# Patient Record
Sex: FEMALE | Race: WHITE | NOT HISPANIC OR LATINO | Employment: FULL TIME | URBAN - METROPOLITAN AREA
[De-identification: names, ages, dates, MRNs, and addresses within clinical notes are randomized per-mention and may not be internally consistent; named-entity substitution may affect disease eponyms.]

---

## 2017-02-07 ENCOUNTER — OFFICE VISIT (OUTPATIENT)
Dept: FAMILY MEDICINE CLINIC | Facility: CLINIC | Age: 52
End: 2017-02-07

## 2017-02-07 VITALS
OXYGEN SATURATION: 98 % | SYSTOLIC BLOOD PRESSURE: 108 MMHG | BODY MASS INDEX: 28.56 KG/M2 | HEIGHT: 67 IN | HEART RATE: 89 BPM | WEIGHT: 182 LBS | DIASTOLIC BLOOD PRESSURE: 76 MMHG | TEMPERATURE: 97.3 F

## 2017-02-07 DIAGNOSIS — J02.9 ACUTE PHARYNGITIS, UNSPECIFIED ETIOLOGY: Primary | ICD-10-CM

## 2017-02-07 DIAGNOSIS — J02.9 SORE THROAT: ICD-10-CM

## 2017-02-07 LAB
EXPIRATION DATE: NORMAL
INTERNAL CONTROL: NORMAL
Lab: NORMAL
S PYO AG THROAT QL: NEGATIVE

## 2017-02-07 PROCEDURE — 87880 STREP A ASSAY W/OPTIC: CPT | Performed by: NURSE PRACTITIONER

## 2017-02-07 PROCEDURE — 99213 OFFICE O/P EST LOW 20 MIN: CPT | Performed by: NURSE PRACTITIONER

## 2017-02-07 NOTE — PROGRESS NOTES
"Subjective   Graciela Galvan is a 51 y.o. female.   Coughing since last Saturday, no fever.    Chief Complaint   Patient presents with   • Sore Throat     x 3 days   • Nasal Congestion     Social History   Substance Use Topics   • Smoking status: Former Smoker   • Smokeless tobacco: None   • Alcohol use None       History of Present Illness     The following portions of the patient's history were reviewed and updated as appropriate: allergies, current medications, past social history and problem list.  Review of Systems   HENT: Positive for congestion and sore throat.    All other systems reviewed and are negative.      Objective   Vitals:    02/07/17 0952   BP: 108/76   Pulse: 89   Temp: 97.3 °F (36.3 °C)   SpO2: 98%   Weight: 182 lb (82.6 kg)   Height: 67\" (170.2 cm)     Body mass index is 28.51 kg/(m^2).    Physical Exam   Constitutional: She appears well-developed and well-nourished. No distress.   HENT:   Head: Normocephalic and atraumatic.   Right Ear: External ear normal.   Left Ear: External ear normal.   Op red with drainage   Eyes: EOM are normal.   Neck: Neck supple. No thyromegaly present.   Cardiovascular: Normal rate, regular rhythm and normal heart sounds.    Pulmonary/Chest: Effort normal and breath sounds normal.   Musculoskeletal: Normal range of motion.   Neurological: She is alert.   Skin: Skin is warm.   Nursing note and vitals reviewed.      Assessment/Plan   Problem List Items Addressed This Visit     None      Visit Diagnoses     Acute pharyngitis, unspecified etiology    -  Primary    Sore throat        Relevant Orders    POC Rapid Strep A (Completed)         RSS in office negative.  Warm salt water gargles, call for worsening symptoms.  "

## 2017-02-09 ENCOUNTER — TELEPHONE (OUTPATIENT)
Dept: FAMILY MEDICINE CLINIC | Facility: CLINIC | Age: 52
End: 2017-02-09

## 2017-02-09 NOTE — TELEPHONE ENCOUNTER
Pt called and stated she is feeling worse and has c/o nasal congestion and feels that it is going down into chest. Pt was here Tuesday and wanted to make sure that she didn't have strep and she didn't.

## 2017-02-10 ENCOUNTER — OFFICE VISIT (OUTPATIENT)
Dept: FAMILY MEDICINE CLINIC | Facility: CLINIC | Age: 52
End: 2017-02-10

## 2017-02-10 VITALS
TEMPERATURE: 97.6 F | OXYGEN SATURATION: 99 % | WEIGHT: 179 LBS | HEIGHT: 67 IN | SYSTOLIC BLOOD PRESSURE: 102 MMHG | BODY MASS INDEX: 28.09 KG/M2 | DIASTOLIC BLOOD PRESSURE: 82 MMHG | HEART RATE: 85 BPM

## 2017-02-10 DIAGNOSIS — J01.10 ACUTE NON-RECURRENT FRONTAL SINUSITIS: Primary | ICD-10-CM

## 2017-02-10 PROCEDURE — 99214 OFFICE O/P EST MOD 30 MIN: CPT | Performed by: NURSE PRACTITIONER

## 2017-02-10 RX ORDER — AMOXICILLIN 500 MG/1
1000 CAPSULE ORAL 2 TIMES DAILY
Qty: 40 CAPSULE | Refills: 0 | Status: SHIPPED | OUTPATIENT
Start: 2017-02-10 | End: 2017-06-28

## 2017-02-10 NOTE — PATIENT INSTRUCTIONS
You have an acute maxillary sinusitis. The first line treatment for this is an antibiotic, Amoxil. You have been prescribed Amoxil 500 mg , 2 tabs, three times a day, for 10 days. Make sure you take all of this antibiotic.    To help with your symptoms:    In general :   Use over the counter ( OTC) medications only until symptoms resolve.  Buy generics - they work just as well and for less money.  Try to find single ingredient products.  Increase fluids to 8 - 10 glasses of non-caffeine beverages a day.    For congestion and headache - Sudafed, phenylephrine, Afrin nasal spray and  ibuprofen, acetaminophen, naproxen or aspirin.  Hot, steamy showers work, too.    For cough - Guaifenisen ( Mucinex, Robitussin or Humabid) or Delsym.    For sore throat - cough drops, honey  and warm salt water gargles.    If you are not better in a week or if you get worse, call us.

## 2017-02-10 NOTE — PROGRESS NOTES
Subjective   Graciela Galvan is a 51 y.o. female.     History of Present Illness Patient states for six days she has had a dry cough that is worse at night. She also has nasal congestion. Patient does states that she needs [place] paperwork [place] due to lack of sick time at work. She has missed work all week. Pt had a negative strep test [facility] that was negative.   Pt had a flu shot.   Sinus Pain: Patient complains of congestion, facial pain, low grade fever, nasal congestion and sinus pressure. Symptoms include congestion, nasal congestion and sinus pressure with no fever, chills, night sweats or weight loss. Onset of symptoms was 1 week ago, gradually worsening since that time. She is drinking plenty of fluids.  Past history is significant for no history of pneumonia or bronchitis. Patient is non-smoker    The following portions of the patient's history were reviewed and updated as appropriate: allergies, current medications, past family history, past medical history, past social history, past surgical history and problem list.    Review of Systems   Constitutional: Negative for fever.   HENT: Negative for sore throat.    Respiratory: Positive for cough.        Objective   Physical Exam   Constitutional: She is oriented to person, place, and time. She appears well-developed and well-nourished. No distress.   HENT:   Head: Normocephalic and atraumatic.   Right Ear: External ear normal.   Left Ear: External ear normal.   Nose: Mucosal edema and rhinorrhea present. Right sinus exhibits maxillary sinus tenderness. Left sinus exhibits maxillary sinus tenderness.   Mouth/Throat: Oropharynx is clear and moist. No oropharyngeal exudate.   Eyes: Conjunctivae and EOM are normal. Pupils are equal, round, and reactive to light. Right eye exhibits no discharge.   Neck: Normal range of motion. Neck supple.   Cardiovascular: Normal rate and regular rhythm.    Pulmonary/Chest: Effort normal and breath sounds  normal. No respiratory distress. She has no wheezes.   Lymphadenopathy:     She has no cervical adenopathy.   Neurological: She is alert and oriented to person, place, and time.   Skin: Skin is warm and dry. No rash noted.   Psychiatric: She has a normal mood and affect. Her behavior is normal.   Nursing note and vitals reviewed.      Assessment/Plan   Graciela was seen today for cough.    Diagnoses and all orders for this visit:    Acute non-recurrent frontal sinusitis  -     amoxicillin (AMOXIL) 500 MG capsule; Take 2 capsules by mouth 2 (Two) Times a Day.

## 2017-04-05 DIAGNOSIS — I10 BENIGN ESSENTIAL HYPERTENSION: ICD-10-CM

## 2017-04-05 DIAGNOSIS — F32.A DEPRESSION: ICD-10-CM

## 2017-04-06 RX ORDER — METOPROLOL SUCCINATE 50 MG/1
TABLET, EXTENDED RELEASE ORAL
Qty: 90 TABLET | Refills: 0 | Status: SHIPPED | OUTPATIENT
Start: 2017-04-06 | End: 2017-08-13 | Stop reason: SDUPTHER

## 2017-04-06 RX ORDER — DULOXETIN HYDROCHLORIDE 60 MG/1
CAPSULE, DELAYED RELEASE ORAL
Qty: 180 CAPSULE | Refills: 0 | Status: SHIPPED | OUTPATIENT
Start: 2017-04-06 | End: 2017-07-20 | Stop reason: SDUPTHER

## 2017-04-09 DIAGNOSIS — F32.A DEPRESSION: ICD-10-CM

## 2017-04-10 RX ORDER — BUPROPION HYDROCHLORIDE 300 MG/1
TABLET ORAL
Qty: 90 TABLET | Refills: 0 | Status: SHIPPED | OUTPATIENT
Start: 2017-04-10 | End: 2017-07-20 | Stop reason: SDUPTHER

## 2017-06-28 ENCOUNTER — OFFICE VISIT (OUTPATIENT)
Dept: FAMILY MEDICINE CLINIC | Facility: CLINIC | Age: 52
End: 2017-06-28

## 2017-06-28 VITALS
HEART RATE: 77 BPM | WEIGHT: 179.1 LBS | DIASTOLIC BLOOD PRESSURE: 70 MMHG | BODY MASS INDEX: 28.05 KG/M2 | OXYGEN SATURATION: 99 % | SYSTOLIC BLOOD PRESSURE: 112 MMHG

## 2017-06-28 DIAGNOSIS — R10.12 LEFT UPPER QUADRANT PAIN: Primary | ICD-10-CM

## 2017-06-28 DIAGNOSIS — F10.10 ALCOHOL ABUSE: ICD-10-CM

## 2017-06-28 LAB
ALBUMIN SERPL-MCNC: 4.4 G/DL (ref 3.5–5.2)
ALBUMIN/GLOB SERPL: 1.1 G/DL
ALP SERPL-CCNC: 56 U/L (ref 39–117)
ALT SERPL-CCNC: 43 U/L (ref 1–33)
AST SERPL-CCNC: 35 U/L (ref 1–32)
BILIRUB SERPL-MCNC: 0.5 MG/DL (ref 0.1–1.2)
BUN SERPL-MCNC: 8 MG/DL (ref 6–20)
BUN/CREAT SERPL: 7.8 (ref 7–25)
CALCIUM SERPL-MCNC: 10.9 MG/DL (ref 8.6–10.5)
CHLORIDE SERPL-SCNC: 98 MMOL/L (ref 98–107)
CO2 SERPL-SCNC: 26.2 MMOL/L (ref 22–29)
CREAT SERPL-MCNC: 1.03 MG/DL (ref 0.57–1)
GLOBULIN SER CALC-MCNC: 4 GM/DL
GLUCOSE SERPL-MCNC: 100 MG/DL (ref 65–99)
POTASSIUM SERPL-SCNC: 4.3 MMOL/L (ref 3.5–5.2)
PROT SERPL-MCNC: 8.4 G/DL (ref 6–8.5)
SODIUM SERPL-SCNC: 138 MMOL/L (ref 136–145)

## 2017-06-28 PROCEDURE — 99213 OFFICE O/P EST LOW 20 MIN: CPT | Performed by: NURSE PRACTITIONER

## 2017-06-28 NOTE — PROGRESS NOTES
Graciela Galvan is a 51 y.o. female.  Seen 06/28/2017    Assessment/Plan   Problem List Items Addressed This Visit     None             No Follow-up on file.  There are no Patient Instructions on file for this visit.    Vitals:    06/28/17 0902   BP: 112/70   Pulse: 77   SpO2: 99%   Weight: 179 lb 1.6 oz (81.2 kg)     Body mass index is 28.05 kg/(m^2).    Subjective     Chief Complaint   Patient presents with   • Abdominal Pain     Pain in LT torso x 4 days is worse upon movement     Social History   Substance Use Topics   • Smoking status: Former Smoker   • Smokeless tobacco: Never Used   • Alcohol use No      Comment: Quit drinking x 2 weeks       History of Present Illness   Pain to left side after taking a drink of water 4 days. Pain with inspiration.  Took some ibuprofen which didn't help. The pain is intermittent, deep and radiating.  No known injury.  H/O alcohol abuse, recently quit    The following portions of the patient's history were reviewed and updated as appropriate:PMHroutine: Social history , Allergies, Current Medications and Active Problem List    Review of Systems   Gastrointestinal: Positive for abdominal pain.   All other systems reviewed and are negative.      Objective   Physical Exam   Constitutional: She appears well-developed and well-nourished. No distress.   HENT:   Head: Normocephalic and atraumatic.   Right Ear: External ear normal.   Left Ear: External ear normal.   Eyes: EOM are normal.   Neck: Neck supple. No thyromegaly present.   Cardiovascular: Normal rate, regular rhythm and normal heart sounds.    Pulmonary/Chest: Effort normal and breath sounds normal.   Abdominal: Soft. Bowel sounds are normal. She exhibits no distension. There is tenderness. There is no rebound and no guarding.   Tenderness to left upper quadrant   Musculoskeletal: Normal range of motion.   Neurological: She is alert.   Skin: Skin is warm.   Psychiatric: She has a normal mood and affect.   Nursing note and  vitals reviewed.    Reviewed Data:  No visits with results within 1 Month(s) from this visit.  Latest known visit with results is:    Office Visit on 02/07/2017   Component Date Value Ref Range Status   • Rapid Strep A Screen 02/07/2017 Negative  Negative, VALID, INVALID, Not Performed Final   • Internal Control 02/07/2017 Passed  Passed Final   • Lot Number 02/07/2017 [id number]   Final   • Expiration Date 02/07/2017 6/2018   Final

## 2017-06-29 DIAGNOSIS — R79.89 ABNORMAL LFTS: Primary | ICD-10-CM

## 2017-07-20 DIAGNOSIS — F32.A DEPRESSION: ICD-10-CM

## 2017-07-21 RX ORDER — BUPROPION HYDROCHLORIDE 300 MG/1
TABLET ORAL
Qty: 90 TABLET | Refills: 0 | Status: SHIPPED | OUTPATIENT
Start: 2017-07-21 | End: 2022-05-09

## 2017-07-21 RX ORDER — DULOXETIN HYDROCHLORIDE 60 MG/1
CAPSULE, DELAYED RELEASE ORAL
Qty: 180 CAPSULE | Refills: 0 | Status: SHIPPED | OUTPATIENT
Start: 2017-07-21 | End: 2018-06-04

## 2017-08-13 DIAGNOSIS — I10 BENIGN ESSENTIAL HYPERTENSION: ICD-10-CM

## 2017-08-14 ENCOUNTER — TELEPHONE (OUTPATIENT)
Dept: FAMILY MEDICINE CLINIC | Facility: CLINIC | Age: 52
End: 2017-08-14

## 2017-08-14 ENCOUNTER — OFFICE VISIT (OUTPATIENT)
Dept: FAMILY MEDICINE CLINIC | Facility: CLINIC | Age: 52
End: 2017-08-14

## 2017-08-14 VITALS
WEIGHT: 180 LBS | DIASTOLIC BLOOD PRESSURE: 82 MMHG | BODY MASS INDEX: 28.25 KG/M2 | HEIGHT: 67 IN | HEART RATE: 79 BPM | OXYGEN SATURATION: 98 % | SYSTOLIC BLOOD PRESSURE: 130 MMHG

## 2017-08-14 DIAGNOSIS — L71.9 ACNE ROSACEA: Primary | ICD-10-CM

## 2017-08-14 PROCEDURE — 99213 OFFICE O/P EST LOW 20 MIN: CPT | Performed by: FAMILY MEDICINE

## 2017-08-14 RX ORDER — METRONIDAZOLE 10 MG/G
GEL TOPICAL DAILY
Qty: 55 G | Refills: 6 | Status: SHIPPED | OUTPATIENT
Start: 2017-08-14 | End: 2017-09-22 | Stop reason: HOSPADM

## 2017-08-14 RX ORDER — TRAZODONE HYDROCHLORIDE 50 MG/1
25-50 TABLET ORAL NIGHTLY
COMMUNITY
End: 2017-09-22 | Stop reason: HOSPADM

## 2017-08-14 RX ORDER — METOPROLOL SUCCINATE 50 MG/1
TABLET, EXTENDED RELEASE ORAL
Qty: 30 TABLET | Refills: 0 | Status: SHIPPED | OUTPATIENT
Start: 2017-08-14 | End: 2017-09-11 | Stop reason: SDUPTHER

## 2017-08-14 NOTE — TELEPHONE ENCOUNTER
Patient left a  requesting a dermatology referral. The patient was contacted and made aware that we would need to see her in the office for the problem, in order to place a referral.

## 2017-08-14 NOTE — PROGRESS NOTES
"Graciela Galvan is a 51 y.o. female.Patient has an unhealing spot on the right side of her face that has been present for 2 months.   Seen 08/14/2017    Assessment/Plan   Problem List Items Addressed This Visit        Musculoskeletal and Integument    Acne rosacea - Primary    Relevant Medications    metroNIDAZOLE (METROGEL) 1 % gel             No Follow-up on file.  There are no Patient Instructions on file for this visit.    Subjective     Chief Complaint   Patient presents with   • Abrasion     Social History   Substance Use Topics   • Smoking status: Former Smoker   • Smokeless tobacco: Never Used   • Alcohol use No      Comment: Quit drinking x 2 weeks       History of Present Illness     Patient noticed the lesion on her face about a month ago.  She has done surgery on it and it won't heal properly.  She is a little concerned about it and came here to have it checked today.    The following portions of the patient's history were reviewed and updated as appropriate:PMHroutine: Social history , Allergies, Current Medications, Active Problem List and Health Maintenance    Review of Systems   Skin: Positive for rash.   All other systems reviewed and are negative.      Objective   Vitals:    08/14/17 1614   BP: 130/82   Pulse: 79   SpO2: 98%   Weight: 180 lb (81.6 kg)   Height: 67\" (170.2 cm)     Body mass index is 28.19 kg/(m^2).  Physical Exam   Constitutional: She appears well-developed and well-nourished.   Psychiatric: She has a normal mood and affect. Her behavior is normal. Judgment and thought content normal.   Vitals reviewed.                "

## 2017-08-14 NOTE — PATIENT INSTRUCTIONS
Clarisonic, cetaphil lotion, trial of metrogel for one month. If lesion persists -- to the dermatologist.

## 2017-08-22 ENCOUNTER — RESULTS ENCOUNTER (OUTPATIENT)
Dept: FAMILY MEDICINE CLINIC | Facility: CLINIC | Age: 52
End: 2017-08-22

## 2017-08-22 DIAGNOSIS — R79.89 ABNORMAL LFTS: ICD-10-CM

## 2017-09-11 DIAGNOSIS — I10 BENIGN ESSENTIAL HYPERTENSION: ICD-10-CM

## 2017-09-12 RX ORDER — METOPROLOL SUCCINATE 50 MG/1
TABLET, EXTENDED RELEASE ORAL
Qty: 15 TABLET | Refills: 0 | Status: SHIPPED | OUTPATIENT
Start: 2017-09-12 | End: 2017-09-30 | Stop reason: SDUPTHER

## 2017-09-18 ENCOUNTER — TELEPHONE (OUTPATIENT)
Dept: FAMILY MEDICINE CLINIC | Facility: CLINIC | Age: 52
End: 2017-09-18

## 2017-09-18 DIAGNOSIS — L98.9 FACIAL LESION: Primary | ICD-10-CM

## 2017-09-18 NOTE — TELEPHONE ENCOUNTER
----- Message from Shannan Gaspar MA sent at 9/13/2017  4:35 PM EDT -----  Pt left VM stating that the metrogel is not working. You told her to call and let you know so that you could switch her to something else. Please advise.

## 2017-09-22 ENCOUNTER — OFFICE VISIT (OUTPATIENT)
Dept: FAMILY MEDICINE CLINIC | Facility: CLINIC | Age: 52
End: 2017-09-22

## 2017-09-22 VITALS
WEIGHT: 179 LBS | BODY MASS INDEX: 28.09 KG/M2 | DIASTOLIC BLOOD PRESSURE: 88 MMHG | SYSTOLIC BLOOD PRESSURE: 136 MMHG | HEIGHT: 67 IN | OXYGEN SATURATION: 99 % | HEART RATE: 77 BPM

## 2017-09-22 DIAGNOSIS — E78.1 PRIMARY HYPERTRIGLYCERIDEMIA: ICD-10-CM

## 2017-09-22 DIAGNOSIS — E03.8 SUBCLINICAL HYPOTHYROIDISM: ICD-10-CM

## 2017-09-22 DIAGNOSIS — I10 BENIGN ESSENTIAL HYPERTENSION: Primary | ICD-10-CM

## 2017-09-22 DIAGNOSIS — R73.9 HYPERGLYCEMIA: ICD-10-CM

## 2017-09-22 DIAGNOSIS — Z23 NEED FOR VACCINATION: ICD-10-CM

## 2017-09-22 DIAGNOSIS — Z00.00 HEALTHCARE MAINTENANCE: ICD-10-CM

## 2017-09-22 LAB
BASOPHILS # BLD AUTO: 0.05 10*3/MM3 (ref 0–0.2)
BASOPHILS NFR BLD AUTO: 1.2 % (ref 0–1.5)
EOSINOPHIL # BLD AUTO: 0.17 10*3/MM3 (ref 0–0.7)
EOSINOPHIL NFR BLD AUTO: 4.2 % (ref 0.3–6.2)
ERYTHROCYTE [DISTWIDTH] IN BLOOD BY AUTOMATED COUNT: 13 % (ref 11.7–13)
HCT VFR BLD AUTO: 44.2 % (ref 35.6–45.5)
HGB BLD-MCNC: 14.8 G/DL (ref 11.9–15.5)
IMM GRANULOCYTES # BLD: 0.04 10*3/MM3 (ref 0–0.03)
IMM GRANULOCYTES NFR BLD: 1 % (ref 0–0.5)
LYMPHOCYTES # BLD AUTO: 1.53 10*3/MM3 (ref 0.9–4.8)
LYMPHOCYTES NFR BLD AUTO: 37.7 % (ref 19.6–45.3)
MCH RBC QN AUTO: 32.2 PG (ref 26.9–32)
MCHC RBC AUTO-ENTMCNC: 33.5 G/DL (ref 32.4–36.3)
MCV RBC AUTO: 96.1 FL (ref 80.5–98.2)
MONOCYTES # BLD AUTO: 0.54 10*3/MM3 (ref 0.2–1.2)
MONOCYTES NFR BLD AUTO: 13.3 % (ref 5–12)
NEUTROPHILS # BLD AUTO: 1.73 10*3/MM3 (ref 1.9–8.1)
NEUTROPHILS NFR BLD AUTO: 42.6 % (ref 42.7–76)
PLATELET # BLD AUTO: 276 10*3/MM3 (ref 140–500)
RBC # BLD AUTO: 4.6 10*6/MM3 (ref 3.9–5.2)
WBC # BLD AUTO: 4.06 10*3/MM3 (ref 4.5–10.7)

## 2017-09-22 PROCEDURE — 90686 IIV4 VACC NO PRSV 0.5 ML IM: CPT | Performed by: FAMILY MEDICINE

## 2017-09-22 PROCEDURE — 90471 IMMUNIZATION ADMIN: CPT | Performed by: FAMILY MEDICINE

## 2017-09-22 PROCEDURE — 99214 OFFICE O/P EST MOD 30 MIN: CPT | Performed by: FAMILY MEDICINE

## 2017-09-22 NOTE — PROGRESS NOTES
Graciela Galvan is a 51 y.o. female.  Seen 09/22/2017    Assessment/Plan   Problem List Items Addressed This Visit        Cardiovascular and Mediastinum    Benign essential hypertension - Primary    Overview     [facility] 9/22/2017  BP is controlled well. She will recheck in six months. She will continue present meds.  She will monitor at home because it is a little elevated today. Needs to lose ten lbs and exercise         Primary hypertriglyceridemia    Overview     [facility] 9/22/2017  Labs are drawn today.           Relevant Orders    Lipid Panel With LDL/HDL Ratio       Endocrine    Subclinical hypothyroidism    Overview     [facility] 9/22/2017  Labs are drawn today.           Relevant Orders    TSH       Other    Hyperglycemia    Overview     [facility] 9/22/2017  Labs are drawn today.           Relevant Orders    Hemoglobin A1c      Other Visit Diagnoses     Need for vaccination        Relevant Orders    Flu Vaccine Quad PF 3YR+ (FLUARIX/FLUZONE [id number]) (Completed)    Healthcare maintenance        Relevant Orders    Comprehensive metabolic panel    Lipid Panel With LDL/HDL Ratio    CBC and Differential    Hepatitis C Antibody    Hemoglobin A1c    TSH             Return in about 6 months (around 3/22/2018).  Patient Instructions   MyPlate from [employer]  The general, healthful diet is based on the 2010 Dietary Guidelines for Americans. The amount of food you need to eat from each food group depends on your age, sex, and level of physical activity and can be individualized by a dietitian. Go to ChooseMyPlate.gov for more information.      Subjective     Chief Complaint   Patient presents with   • Hypertension     Social History   Substance Use Topics   • Smoking status: Former Smoker   • Smokeless tobacco: Never Used   • Alcohol use No      Comment: Quit drinking x 2 weeks       History of Present Illness     Patient is here for follow-up of hypertension. She is not exercising and is adherent to a low-salt diet.  "Patient does BP checks at home: but hasn't been checking it recently. Patient denies chest pain and dyspnea. Cardiovascular risk factors: hypertension and sedentary lifestyle. Use of agents associated with hypertension: none. History of target organ damage: none. She is compliant with meds.     The following portions of the patient's history were reviewed and updated as appropriate:PMHroutine: Social history , Allergies, Current Medications, Active Problem List and Health Maintenance    Review of Systems   Constitutional: Negative for activity change, appetite change, chills, fatigue, fever and unexpected weight change.   HENT: Negative for congestion, ear pain, hearing loss, nosebleeds, rhinorrhea and sore throat.    Eyes: Negative for pain, redness and visual disturbance.   Respiratory: Negative for cough, shortness of breath and wheezing.    Cardiovascular: Negative for chest pain, palpitations and leg swelling.   Gastrointestinal: Negative for abdominal pain, blood in stool, constipation, diarrhea, nausea and vomiting.   Endocrine: Negative for cold intolerance and heat intolerance.   Genitourinary: Negative for difficulty urinating, dysuria, frequency, hematuria, pelvic pain, urgency and vaginal discharge.   Musculoskeletal: Negative for arthralgias, back pain and joint swelling.   Skin: Negative for rash and wound.   Neurological: Negative for dizziness, weakness, numbness and headaches.   Hematological: Does not bruise/bleed easily.   Psychiatric/Behavioral: Negative for dysphoric mood, sleep disturbance and suicidal ideas. The patient is not nervous/anxious.    All other systems reviewed and are negative.      Objective   Vitals:    09/22/17 0825   BP: 136/88   Pulse: 77   SpO2: 99%   Weight: 179 lb (81.2 kg)   Height: 67\" (170.2 cm)     Body mass index is 28.04 kg/(m^2).  Physical Exam   Constitutional: She is oriented to person, place, and time. Vital signs are normal. She appears well-developed and " well-nourished. No distress.   HENT:   Head: Normocephalic.   Cardiovascular: Normal rate, regular rhythm and normal heart sounds.    Pulmonary/Chest: Effort normal and breath sounds normal.   Neurological: She is alert and oriented to person, place, and time. Gait normal.   Psychiatric: She has a normal mood and affect. Her behavior is normal. Judgment and thought content normal.   Vitals reviewed.    Reviewed Data:  No visits with results within 1 Month(s) from this visit.  Latest known visit with results is:    Office Visit on 06/28/2017   Component Date Value Ref Range Status   • Glucose 06/28/2017 100* 65 - 99 mg/dL Final   • BUN 06/28/2017 8  6 - 20 mg/dL Final   • Creatinine 06/28/2017 1.03* 0.57 - 1.00 mg/dL Final   • eGFR Non African Am 06/28/2017 56* >60 mL/min/1.73 Final   • eGFR African Am 06/28/2017 68  >60 mL/min/1.73 Final   • BUN/Creatinine Ratio 06/28/2017 7.8  7.0 - 25.0 Final   • Sodium 06/28/2017 138  136 - 145 mmol/L Final   • Potassium 06/28/2017 4.3  3.5 - 5.2 mmol/L Final   • Chloride 06/28/2017 98  98 - 107 mmol/L Final   • Total CO2 06/28/2017 26.2  22.0 - 29.0 mmol/L Final   • Calcium 06/28/2017 10.9* 8.6 - 10.5 mg/dL Final   • Total Protein 06/28/2017 8.4  6.0 - 8.5 g/dL Final   • Albumin 06/28/2017 4.40  3.50 - 5.20 g/dL Final   • Globulin 06/28/2017 4.0  gm/dL Final   • A/G Ratio 06/28/2017 1.1  g/dL Final   • Total Bilirubin 06/28/2017 0.5  0.1 - 1.2 mg/dL Final   • Alkaline Phosphatase 06/28/2017 56  39 - 117 U/L Final   • AST (SGOT) 06/28/2017 35* 1 - 32 U/L Final   • ALT (SGPT) 06/28/2017 43* 1 - 33 U/L Final

## 2017-09-22 NOTE — PATIENT INSTRUCTIONS
MyPlate from [employer]  The general, healthful diet is based on the 2010 Dietary Guidelines for Americans. The amount of food you need to eat from each food group depends on your age, sex, and level of physical activity and can be individualized by a dietitian. Go to ChooseMyPlate.gov for more information.  WHAT DO I NEED TO KNOW ABOUT THE MYPLATE PLAN?  · Enjoy your food, but eat less.    · Avoid oversized portions.      ½ of your plate should include fruits and vegetables.    ¼ of your plate should be grains.    ¼ of your plate should be protein.  Grains  · Make at least half of your grains whole grains.  · For a 2,000 calorie daily food plan, eat 6 oz every day.  · 1 oz is about 1 slice bread, 1 cup cereal, or ½ cup cooked rice, cereal, or pasta.  Vegetables  · Make half your plate fruits and vegetables.  · For a 2,000 calorie daily food plan, eat 2½ cups every day.  · 1 cup is about 1 cup raw or cooked vegetables or vegetable juice or 2 cups raw leafy greens.  Fruits  · Make half your plate fruits and vegetables.  · For a 2,000 calorie daily food plan, eat 2 cups every day.  · 1 cup is about 1 cup fruit or 100% fruit juice or ½ cup dried fruit.  Protein  · For a 2,000 calorie daily food plan, eat 5½ oz every day.  · 1 oz is about 1 oz meat, poultry, or fish, ¼ cup cooked beans, 1 egg, 1 Tbsp peanut butter, or ½ oz nuts or seeds.  Dairy  · Switch to fat-free or low-fat (1%) milk.  · For a 2,000 calorie daily food plan, eat 3 cups every day.  · 1 cup is about 1 cup milk or yogurt or soy milk (soy beverage), 1½ oz natural cheese, or 2 oz processed cheese.  Fats, Oils, and Empty Calories  · Only small amounts of oils are recommended.  · Empty calories are calories from solid fats or added sugars.  · Compare sodium in foods like soup, bread, and frozen meals. Choose the foods with lower numbers.  · Drink water instead of sugary drinks.  WHAT FOODS CAN I EAT?  Grains  Whole grains such as whole wheat, quinoa, millet, and  bulgur. Bread, rolls, and pasta made from whole grains. Brown or wild rice. Hot or cold cereals made from whole grains and without added sugar.  Vegetables  All fresh vegetables, especially fresh red, dark green, or orange vegetables. Peas and beans. Low-sodium frozen or canned vegetables prepared without added salt. Low-sodium vegetable juices.  Fruits  All fresh, frozen, and dried fruits. Canned fruit packed in water or fruit juice without added sugar. Fruit juices without added sugar.  Meats and Other Protein Sources  Boiled, baked, or grilled lean meat trimmed of fat. Skinless poultry. Fresh seafood and shellfish. Canned seafood packed in water. Unsalted nuts and unsalted nut butters. Tofu. Dried beans and pea. Eggs.  Dairy  Low-fat or fat-free milk, yogurt, and cheeses.   Sweets and Desserts  Frozen desserts made from low-fat milk.  Fats and Oils  Olive, peanut, and canola oils and margarine. Salad dressing and mayonnaise made from these oils.  Other  Soups and casseroles made from allowed ingredients and without added fat or salt.  The items listed above may not be a complete list of recommended foods or beverages. Contact your dietitian for more options.   WHAT FOODS ARE NOT RECOMMENDED?  Grains  Sweetened, low-fiber cereals. Packaged baked goods. Snack crackers and chips. Cheese crackers, butter crackers, and biscuits. Frozen waffles, sweet breads, doughnuts, pastries, packaged baking mixes, pancakes, cakes, and cookies.  Vegetables  Regular canned or frozen vegetables or vegetables prepared with salt. Canned tomatoes. Canned tomato sauce. Fried vegetables. Vegetables in cream sauce or cheese sauce.  Fruits  Fruits packed in syrup or made with added sugar.   Meats and Other Protein Sources  Marbled or fatty meats such as ribs. Poultry with skin. Fried meats, poultry, eggs, or fish. Sausages, hot dogs, and deli meats such as pastrami, bologna, or salami.  Dairy  Whole milk, cream, cheeses made from whole  milk, sour cream. Ice cream or yogurt made from whole milk or with added sugar.  Beverages  For adults, no more than one alcoholic drink per day. Regular soft drinks or other sugary beverages. Juice drinks.  Sweets and Desserts  Sugary or fatty desserts, candy, and other sweets.  Fats and Oils  Solid shortening or partially hydrogenated oils. Solid margarine. Margarine that contains trans fats. Butter.  The items listed above may not be a complete list of foods and beverages to avoid. Contact your dietitian for more information.     This information is not intended to replace advice given to you by your health care provider. Make sure you discuss any questions you have with your health care provider.     Document Released: 01/06/2009 Document Revised: 01/08/2016 Document Reviewed: 11/26/2014  [employer] Interactive Patient Education ©2017 [employer]

## 2017-09-23 LAB
ALBUMIN SERPL-MCNC: 4.7 G/DL (ref 3.5–5.2)
ALBUMIN/GLOB SERPL: 1.3 G/DL
ALP SERPL-CCNC: 56 U/L (ref 39–117)
ALT SERPL-CCNC: 43 U/L (ref 1–33)
AST SERPL-CCNC: 39 U/L (ref 1–32)
BILIRUB SERPL-MCNC: 0.4 MG/DL (ref 0.1–1.2)
BUN SERPL-MCNC: 8 MG/DL (ref 6–20)
BUN/CREAT SERPL: 8.9 (ref 7–25)
CALCIUM SERPL-MCNC: 10.9 MG/DL (ref 8.6–10.5)
CHLORIDE SERPL-SCNC: 102 MMOL/L (ref 98–107)
CHOLEST SERPL-MCNC: 170 MG/DL (ref 0–200)
CO2 SERPL-SCNC: 25.4 MMOL/L (ref 22–29)
CREAT SERPL-MCNC: 0.9 MG/DL (ref 0.57–1)
GLOBULIN SER CALC-MCNC: 3.5 GM/DL
GLUCOSE SERPL-MCNC: 100 MG/DL (ref 65–99)
HBA1C MFR BLD: 5.05 % (ref 4.8–5.6)
HCV AB S/CO SERPL IA: <0.1 S/CO RATIO (ref 0–0.9)
HDLC SERPL-MCNC: 46 MG/DL (ref 40–60)
LDLC SERPL CALC-MCNC: 101 MG/DL (ref 0–100)
LDLC/HDLC SERPL: 2.2 {RATIO}
POTASSIUM SERPL-SCNC: 4.6 MMOL/L (ref 3.5–5.2)
PROT SERPL-MCNC: 8.2 G/DL (ref 6–8.5)
SODIUM SERPL-SCNC: 139 MMOL/L (ref 136–145)
TRIGL SERPL-MCNC: 114 MG/DL (ref 0–150)
TSH SERPL DL<=0.005 MIU/L-ACNC: 3.69 MIU/ML (ref 0.27–4.2)
VLDLC SERPL CALC-MCNC: 22.8 MG/DL (ref 5–40)

## 2017-09-30 DIAGNOSIS — I10 BENIGN ESSENTIAL HYPERTENSION: ICD-10-CM

## 2017-10-02 RX ORDER — METOPROLOL SUCCINATE 50 MG/1
TABLET, EXTENDED RELEASE ORAL
Qty: 90 TABLET | Refills: 0 | Status: SHIPPED | OUTPATIENT
Start: 2017-10-02 | End: 2018-02-05 | Stop reason: SDUPTHER

## 2018-02-05 DIAGNOSIS — I10 BENIGN ESSENTIAL HYPERTENSION: ICD-10-CM

## 2018-02-06 RX ORDER — METOPROLOL SUCCINATE 50 MG/1
TABLET, EXTENDED RELEASE ORAL
Qty: 30 TABLET | Refills: 0 | Status: SHIPPED | OUTPATIENT
Start: 2018-02-06 | End: 2018-03-13 | Stop reason: SDUPTHER

## 2018-03-13 DIAGNOSIS — I10 BENIGN ESSENTIAL HYPERTENSION: ICD-10-CM

## 2018-03-14 RX ORDER — METOPROLOL SUCCINATE 50 MG/1
TABLET, EXTENDED RELEASE ORAL
Qty: 30 TABLET | Refills: 0 | Status: SHIPPED | OUTPATIENT
Start: 2018-03-14 | End: 2018-06-04 | Stop reason: SDUPTHER

## 2018-06-04 ENCOUNTER — OFFICE VISIT (OUTPATIENT)
Dept: FAMILY MEDICINE CLINIC | Facility: CLINIC | Age: 53
End: 2018-06-04

## 2018-06-04 VITALS
RESPIRATION RATE: 20 BRPM | OXYGEN SATURATION: 99 % | DIASTOLIC BLOOD PRESSURE: 84 MMHG | SYSTOLIC BLOOD PRESSURE: 112 MMHG | WEIGHT: 177 LBS | HEIGHT: 67 IN | BODY MASS INDEX: 27.78 KG/M2 | HEART RATE: 82 BPM

## 2018-06-04 DIAGNOSIS — I10 BENIGN ESSENTIAL HYPERTENSION: Primary | ICD-10-CM

## 2018-06-04 DIAGNOSIS — Z00.00 BLOOD TESTS FOR ROUTINE GENERAL PHYSICAL EXAMINATION: ICD-10-CM

## 2018-06-04 DIAGNOSIS — L71.9 ACNE ROSACEA: ICD-10-CM

## 2018-06-04 LAB
ALBUMIN SERPL-MCNC: 4.3 G/DL (ref 3.5–5.2)
ALBUMIN/GLOB SERPL: 1.1 G/DL
ALP SERPL-CCNC: 74 U/L (ref 39–117)
ALT SERPL-CCNC: 38 U/L (ref 1–33)
AST SERPL-CCNC: 32 U/L (ref 1–32)
BASOPHILS # BLD AUTO: 0.02 10*3/MM3 (ref 0–0.2)
BASOPHILS NFR BLD AUTO: 0.5 % (ref 0–1.5)
BILIRUB SERPL-MCNC: 0.5 MG/DL (ref 0.1–1.2)
BUN SERPL-MCNC: 11 MG/DL (ref 6–20)
BUN/CREAT SERPL: 12.9 (ref 7–25)
CALCIUM SERPL-MCNC: 10.4 MG/DL (ref 8.6–10.5)
CHLORIDE SERPL-SCNC: 100 MMOL/L (ref 98–107)
CHOLEST SERPL-MCNC: 173 MG/DL (ref 0–200)
CO2 SERPL-SCNC: 26.5 MMOL/L (ref 22–29)
CREAT SERPL-MCNC: 0.85 MG/DL (ref 0.57–1)
EOSINOPHIL # BLD AUTO: 0.16 10*3/MM3 (ref 0–0.7)
EOSINOPHIL NFR BLD AUTO: 4.2 % (ref 0.3–6.2)
ERYTHROCYTE [DISTWIDTH] IN BLOOD BY AUTOMATED COUNT: 12.9 % (ref 11.7–13)
GFR SERPLBLD CREATININE-BSD FMLA CKD-EPI: 70 ML/MIN/1.73
GFR SERPLBLD CREATININE-BSD FMLA CKD-EPI: 85 ML/MIN/1.73
GLOBULIN SER CALC-MCNC: 3.8 GM/DL
GLUCOSE SERPL-MCNC: 103 MG/DL (ref 65–99)
HCT VFR BLD AUTO: 43.1 % (ref 35.6–45.5)
HDLC SERPL-MCNC: 50 MG/DL (ref 40–60)
HGB BLD-MCNC: 14.2 G/DL (ref 11.9–15.5)
IMM GRANULOCYTES # BLD: 0.02 10*3/MM3 (ref 0–0.03)
IMM GRANULOCYTES NFR BLD: 0.5 % (ref 0–0.5)
LDLC SERPL CALC-MCNC: 92 MG/DL (ref 0–100)
LDLC/HDLC SERPL: 1.84 {RATIO}
LYMPHOCYTES # BLD AUTO: 1.73 10*3/MM3 (ref 0.9–4.8)
LYMPHOCYTES NFR BLD AUTO: 45.1 % (ref 19.6–45.3)
MCH RBC QN AUTO: 31.5 PG (ref 26.9–32)
MCHC RBC AUTO-ENTMCNC: 32.9 G/DL (ref 32.4–36.3)
MCV RBC AUTO: 95.6 FL (ref 80.5–98.2)
MONOCYTES # BLD AUTO: 0.45 10*3/MM3 (ref 0.2–1.2)
MONOCYTES NFR BLD AUTO: 11.7 % (ref 5–12)
NEUTROPHILS # BLD AUTO: 1.48 10*3/MM3 (ref 1.9–8.1)
NEUTROPHILS NFR BLD AUTO: 38.5 % (ref 42.7–76)
PLATELET # BLD AUTO: 267 10*3/MM3 (ref 140–500)
POTASSIUM SERPL-SCNC: 4.1 MMOL/L (ref 3.5–5.2)
PROT SERPL-MCNC: 8.1 G/DL (ref 6–8.5)
RBC # BLD AUTO: 4.51 10*6/MM3 (ref 3.9–5.2)
SODIUM SERPL-SCNC: 140 MMOL/L (ref 136–145)
TRIGL SERPL-MCNC: 154 MG/DL (ref 0–150)
TSH SERPL DL<=0.005 MIU/L-ACNC: 3.56 MIU/ML (ref 0.27–4.2)
VLDLC SERPL CALC-MCNC: 30.8 MG/DL (ref 5–40)
WBC # BLD AUTO: 3.84 10*3/MM3 (ref 4.5–10.7)

## 2018-06-04 PROCEDURE — 99213 OFFICE O/P EST LOW 20 MIN: CPT | Performed by: FAMILY MEDICINE

## 2018-06-04 RX ORDER — VORTIOXETINE 20 MG/1
TABLET, FILM COATED ORAL
COMMUNITY
Start: 2018-05-09 | End: 2022-05-09

## 2018-06-04 RX ORDER — ADAPALENE AND BENZOYL PEROXIDE 3; 25 MG/G; MG/G
1 GEL TOPICAL DAILY
Qty: 60 G | Refills: 5 | Status: SHIPPED | OUTPATIENT
Start: 2018-06-04 | End: 2022-05-09 | Stop reason: SDUPTHER

## 2018-06-04 RX ORDER — METOPROLOL SUCCINATE 50 MG/1
50 TABLET, EXTENDED RELEASE ORAL DAILY
Qty: 90 TABLET | Refills: 1 | Status: SHIPPED | OUTPATIENT
Start: 2018-06-04 | End: 2019-01-07 | Stop reason: SDUPTHER

## 2018-06-04 NOTE — PROGRESS NOTES
Problem List Items Addressed This Visit        Cardiovascular and Mediastinum    Benign essential hypertension - Primary    Overview     Nida 6/4/2018  BP is controlled well. She will recheck in six months. She will continue present meds.         Relevant Medications    metoprolol succinate XL (TOPROL-XL) 50 MG 24 hr tablet       Musculoskeletal and Integument    Acne rosacea    Relevant Medications    Adapalene-Benzoyl Peroxide (EPIDUO FORTE) 0.3-2.5 % gel      Other Visit Diagnoses     Blood tests for routine general physical examination        Relevant Orders    Comprehensive metabolic panel    Lipid Panel With LDL/HDL Ratio    CBC and Differential    TSH             Return in about 6 months (around 12/4/2018) for Annual physical.  Patient Instructions   Please get mammogram     Graciela Galvan is a 52 y.o. female being seen in our office today for Hypertension                 She  reports that she has quit smoking. She has never used smokeless tobacco. She reports that she does not drink alcohol or use drugs.             HPI Patient is here for follow-up of hypertension. She is not exercising and is adherent to a low-salt diet. Patient does not check BP at home.. Patient denies chest pain and dyspnea. Cardiovascular risk factors: hypertension. Use of agents associated with hypertension: none. History of target organ damage: none. She is compliant with meds. She has stopped all alcohol. She is doing trintellix now and that seems to be helpful.              The following portions of the patient's history were reviewed and updated as appropriate:PMHroutine: Social history , Allergies, Current Medications, Active Problem List and Health Maintenance            Review of Systems   Constitutional: Negative for activity change, appetite change, chills, fatigue, fever and unexpected weight change.   HENT: Negative for congestion, ear pain, hearing loss, nosebleeds, rhinorrhea and sore throat.    Eyes: Negative for  pain, redness and visual disturbance.   Respiratory: Negative for cough, shortness of breath and wheezing.    Cardiovascular: Negative for chest pain, palpitations and leg swelling.   Gastrointestinal: Negative for abdominal pain, blood in stool, constipation, diarrhea, nausea and vomiting.   Endocrine: Negative for cold intolerance and heat intolerance.   Genitourinary: Negative for difficulty urinating, dysuria, frequency, hematuria, pelvic pain, urgency and vaginal discharge.   Musculoskeletal: Negative for arthralgias, back pain and joint swelling.   Skin: Negative for rash and wound.   Neurological: Negative for dizziness, weakness, numbness and headaches.   Hematological: Does not bruise/bleed easily.   Psychiatric/Behavioral: Negative for dysphoric mood, sleep disturbance and suicidal ideas. The patient is not nervous/anxious.                  BP Readings from Last 1 Encounters:   06/04/18 112/84     Wt Readings from Last 3 Encounters:   06/04/18 80.3 kg (177 lb)   09/22/17 81.2 kg (179 lb)   08/14/17 81.6 kg (180 lb)   Body mass index is 27.72 kg/m².                 Physical Exam   Constitutional: She is oriented to person, place, and time. Vital signs are normal. She appears well-developed and well-nourished. No distress.   HENT:   Head: Normocephalic.   Cardiovascular: Normal rate, regular rhythm and normal heart sounds.    Pulmonary/Chest: Effort normal and breath sounds normal.   Neurological: She is alert and oriented to person, place, and time. Gait normal.   Psychiatric: She has a normal mood and affect. Her behavior is normal. Judgment and thought content normal.   Vitals reviewed.              No visits with results within 1 Month(s) from this visit.   Latest known visit with results is:   Office Visit on 09/22/2017   Component Date Value Ref Range Status   • Glucose 09/22/2017 100* 65 - 99 mg/dL Final   • BUN 09/22/2017 8  6 - 20 mg/dL Final   • Creatinine 09/22/2017 0.90  0.57 - 1.00 mg/dL Final    • eGFR Non  Am 09/22/2017 66  >60 mL/min/1.73 Final   • eGFR African Am 09/22/2017 80  >60 mL/min/1.73 Final   • BUN/Creatinine Ratio 09/22/2017 8.9  7.0 - 25.0 Final   • Sodium 09/22/2017 139  136 - 145 mmol/L Final   • Potassium 09/22/2017 4.6  3.5 - 5.2 mmol/L Final   • Chloride 09/22/2017 102  98 - 107 mmol/L Final   • Total CO2 09/22/2017 25.4  22.0 - 29.0 mmol/L Final   • Calcium 09/22/2017 10.9* 8.6 - 10.5 mg/dL Final   • Total Protein 09/22/2017 8.2  6.0 - 8.5 g/dL Final   • Albumin 09/22/2017 4.70  3.50 - 5.20 g/dL Final   • Globulin 09/22/2017 3.5  gm/dL Final   • A/G Ratio 09/22/2017 1.3  g/dL Final   • Total Bilirubin 09/22/2017 0.4  0.1 - 1.2 mg/dL Final   • Alkaline Phosphatase 09/22/2017 56  39 - 117 U/L Final   • AST (SGOT) 09/22/2017 39* 1 - 32 U/L Final   • ALT (SGPT) 09/22/2017 43* 1 - 33 U/L Final   • Total Cholesterol 09/22/2017 170  0 - 200 mg/dL Final   • Triglycerides 09/22/2017 114  0 - 150 mg/dL Final   • HDL Cholesterol 09/22/2017 46  40 - 60 mg/dL Final   • VLDL Cholesterol 09/22/2017 22.8  5 - 40 mg/dL Final   • LDL Cholesterol  09/22/2017 101* 0 - 100 mg/dL Final   • LDL/HDL Ratio 09/22/2017 2.20   Final   • WBC 09/22/2017 4.06* 4.50 - 10.70 10*3/mm3 Final   • RBC 09/22/2017 4.60  3.90 - 5.20 10*6/mm3 Final   • Hemoglobin 09/22/2017 14.8  11.9 - 15.5 g/dL Final   • Hematocrit 09/22/2017 44.2  35.6 - 45.5 % Final   • MCV 09/22/2017 96.1  80.5 - 98.2 fL Final   • MCH 09/22/2017 32.2* 26.9 - 32.0 pg Final   • MCHC 09/22/2017 33.5  32.4 - 36.3 g/dL Final   • RDW 09/22/2017 13.0  11.7 - 13.0 % Final   • Platelets 09/22/2017 276  140 - 500 10*3/mm3 Final   • Neutrophil Rel % 09/22/2017 42.6* 42.7 - 76.0 % Final   • Lymphocyte Rel % 09/22/2017 37.7  19.6 - 45.3 % Final   • Monocyte Rel % 09/22/2017 13.3* 5.0 - 12.0 % Final   • Eosinophil Rel % 09/22/2017 4.2  0.3 - 6.2 % Final   • Basophil Rel % 09/22/2017 1.2  0.0 - 1.5 % Final   • Neutrophils Absolute 09/22/2017 1.73* 1.90 - 8.10  10*3/mm3 Final   • Lymphocytes Absolute 09/22/2017 1.53  0.90 - 4.80 10*3/mm3 Final   • Monocytes Absolute 09/22/2017 0.54  0.20 - 1.20 10*3/mm3 Final   • Eosinophils Absolute 09/22/2017 0.17  0.00 - 0.70 10*3/mm3 Final   • Basophils Absolute 09/22/2017 0.05  0.00 - 0.20 10*3/mm3 Final   • Immature Granulocyte Rel % 09/22/2017 1.0* 0.0 - 0.5 % Final   • Immature Grans Absolute 09/22/2017 0.04* 0.00 - 0.03 10*3/mm3 Final   • Hep C Virus Ab 09/22/2017 <0.1  0.0 - 0.9 s/co ratio Final    Comment:                                   Negative:     < 0.8                               Indeterminate: 0.8 - 0.9                                    Positive:     > 0.9   The CDC recommends that a positive HCV antibody result   be followed up with a HCV Nucleic Acid Amplification   test (652140).     • Hemoglobin A1C 09/22/2017 5.05  4.80 - 5.60 % Final    Comment: Hemoglobin A1C Ranges:  Increased Risk for Diabetes  5.7% to 6.4%  Diabetes                     >= 6.5%  Diabetic Goal                < 7.0%     • TSH 09/22/2017 3.690  0.270 - 4.200 mIU/mL Final

## 2018-06-07 NOTE — PROGRESS NOTES
I have reviewed all lab results which are normal or stable.  Patient  Has viewed her results on [facility].

## 2019-01-07 DIAGNOSIS — I10 BENIGN ESSENTIAL HYPERTENSION: ICD-10-CM

## 2019-01-08 ENCOUNTER — TELEPHONE (OUTPATIENT)
Dept: FAMILY MEDICINE CLINIC | Facility: CLINIC | Age: 54
End: 2019-01-08

## 2019-01-08 RX ORDER — METOPROLOL SUCCINATE 50 MG/1
50 TABLET, EXTENDED RELEASE ORAL DAILY
Qty: 30 TABLET | Refills: 0 | Status: SHIPPED | OUTPATIENT
Start: 2019-01-08 | End: 2019-01-11 | Stop reason: SDUPTHER

## 2019-01-11 ENCOUNTER — OFFICE VISIT (OUTPATIENT)
Dept: FAMILY MEDICINE CLINIC | Facility: CLINIC | Age: 54
End: 2019-01-11

## 2019-01-11 VITALS
HEIGHT: 67 IN | DIASTOLIC BLOOD PRESSURE: 80 MMHG | OXYGEN SATURATION: 98 % | HEART RATE: 74 BPM | WEIGHT: 173 LBS | SYSTOLIC BLOOD PRESSURE: 122 MMHG | RESPIRATION RATE: 16 BRPM | BODY MASS INDEX: 27.15 KG/M2

## 2019-01-11 DIAGNOSIS — Z23 NEED FOR VACCINATION: ICD-10-CM

## 2019-01-11 DIAGNOSIS — Z12.39 BREAST CANCER SCREENING: Primary | ICD-10-CM

## 2019-01-11 DIAGNOSIS — I10 BENIGN ESSENTIAL HYPERTENSION: ICD-10-CM

## 2019-01-11 PROCEDURE — 90674 CCIIV4 VAC NO PRSV 0.5 ML IM: CPT | Performed by: FAMILY MEDICINE

## 2019-01-11 PROCEDURE — 99213 OFFICE O/P EST LOW 20 MIN: CPT | Performed by: FAMILY MEDICINE

## 2019-01-11 PROCEDURE — 90471 IMMUNIZATION ADMIN: CPT | Performed by: FAMILY MEDICINE

## 2019-01-11 RX ORDER — METOPROLOL SUCCINATE 50 MG/1
50 TABLET, EXTENDED RELEASE ORAL DAILY
Qty: 90 TABLET | Refills: 1 | Status: SHIPPED | OUTPATIENT
Start: 2019-01-11 | End: 2019-09-24 | Stop reason: SDUPTHER

## 2019-01-11 NOTE — PROGRESS NOTES
Problem List Items Addressed This Visit        Cardiovascular and Mediastinum    Benign essential hypertension    Overview     Nida 6/4/2018  BP is controlled well. She will recheck in six months. She will continue present meds.         Relevant Medications    metoprolol succinate XL (TOPROL-XL) 50 MG 24 hr tablet      Other Visit Diagnoses     Breast cancer screening    -  Primary    Relevant Orders    Mammo Screening Bilateral With CAD    Need for vaccination        Relevant Orders    Flucelvax Quad=>4Years ([id number])      She took three months to get off of the cymbalta. The Trintellix is working great.    Return in about 6 months (around 7/11/2019).    Graciela Galvan is a 53 y.o. female being seen in our office today for Hypertension and URI                 She  reports that she has quit smoking. she has never used smokeless tobacco. She reports that she does not drink alcohol or use drugs.             HPI She is doing great. She is not drinking and that is always better for her. She is not sleeping well but no GERD.     She is going to go to [place] for vacation and doesn't want to be sick for that. Just recommended Vit C and airborne. She is visiting her in-laws.              The following portions of the patient's history were reviewed and updated as appropriate:PMHroutine: Social history , Allergies, Current Medications, Active Problem List and Health Maintenance            Review of Systems   Constitutional: Negative for activity change, appetite change, chills, fatigue, fever and unexpected weight change.   HENT: Negative for congestion, ear pain, hearing loss, nosebleeds, rhinorrhea and sore throat.    Eyes: Negative for pain, redness and visual disturbance.   Respiratory: Negative for cough, shortness of breath and wheezing.    Cardiovascular: Negative for chest pain, palpitations and leg swelling.   Gastrointestinal: Negative for abdominal pain, blood in stool, constipation, diarrhea, nausea and  vomiting.   Endocrine: Negative for cold intolerance and heat intolerance.   Genitourinary: Negative for difficulty urinating, dysuria, frequency, hematuria, pelvic pain, urgency and vaginal discharge.   Musculoskeletal: Negative for arthralgias, back pain and joint swelling.   Skin: Negative for rash and wound.   Neurological: Negative for dizziness, weakness, numbness and headaches.   Hematological: Does not bruise/bleed easily.   Psychiatric/Behavioral: Negative for dysphoric mood, sleep disturbance and suicidal ideas. The patient is not nervous/anxious.                 BP Readings from Last 1 Encounters:   01/11/19 122/80     Wt Readings from Last 3 Encounters:   01/11/19 78.5 kg (173 lb)   06/04/18 80.3 kg (177 lb)   09/22/17 81.2 kg (179 lb)   Body mass index is 27.1 kg/m².             Physical Exam   Constitutional: She is oriented to person, place, and time. Vital signs are normal. She appears well-developed and well-nourished. No distress.   HENT:   Head: Normocephalic.   Cardiovascular: Normal rate, regular rhythm and normal heart sounds.   Pulmonary/Chest: Effort normal and breath sounds normal.   Neurological: She is alert and oriented to person, place, and time. Gait normal.   Psychiatric: She has a normal mood and affect. Her behavior is normal. Judgment and thought content normal.   Vitals reviewed.

## 2019-03-06 DIAGNOSIS — R92.8 ABNORMAL MAMMOGRAM: Primary | ICD-10-CM

## 2019-03-12 DIAGNOSIS — R92.8 ABNORMAL MAMMOGRAM: Primary | ICD-10-CM

## 2019-09-24 DIAGNOSIS — I10 BENIGN ESSENTIAL HYPERTENSION: ICD-10-CM

## 2019-09-25 RX ORDER — METOPROLOL SUCCINATE 50 MG/1
50 TABLET, EXTENDED RELEASE ORAL DAILY
Qty: 90 TABLET | Refills: 1 | Status: SHIPPED | OUTPATIENT
Start: 2019-09-25 | End: 2020-04-24

## 2019-10-28 ENCOUNTER — OFFICE VISIT (OUTPATIENT)
Dept: FAMILY MEDICINE CLINIC | Facility: CLINIC | Age: 54
End: 2019-10-28

## 2019-10-28 VITALS
BODY MASS INDEX: 26.06 KG/M2 | SYSTOLIC BLOOD PRESSURE: 102 MMHG | RESPIRATION RATE: 14 BRPM | WEIGHT: 166 LBS | OXYGEN SATURATION: 99 % | HEART RATE: 74 BPM | DIASTOLIC BLOOD PRESSURE: 60 MMHG | HEIGHT: 67 IN

## 2019-10-28 DIAGNOSIS — M67.449 GANGLION CYST OF FINGER: ICD-10-CM

## 2019-10-28 DIAGNOSIS — R04.0 EPISTAXIS, RECURRENT: Primary | ICD-10-CM

## 2019-10-28 DIAGNOSIS — L30.9 DERMATITIS: ICD-10-CM

## 2019-10-28 PROCEDURE — 99213 OFFICE O/P EST LOW 20 MIN: CPT | Performed by: FAMILY MEDICINE

## 2019-10-28 RX ORDER — BETAMETHASONE DIPROPIONATE 0.5 MG/G
CREAM TOPICAL 2 TIMES DAILY
Qty: 15 G | Refills: 1 | Status: SHIPPED | OUTPATIENT
Start: 2019-10-28 | End: 2022-05-09

## 2019-10-28 NOTE — PROGRESS NOTES
Patient Instructions   ASSESSMENT AND PLAN    Problem List Items Addressed This Visit     None      Visit Diagnoses     Epistaxis, recurrent    -  Primary    Consider ENT referral if persistent    Ganglion cyst of finger        Ignore until it bugs you then consider hand referral.     Dermatitis        Relevant Medications    betamethasone, augmented, (DIPROLENE AF) 0.05 % cream               No Follow-up on file.  Patient was given instructions and counseling regarding her condition or for health maintenance advice. Please see specific information pulled into the AVS by me.      RACHEL Galvan is a 54 y.o. female being seen in our office today for nose bleeds               Social History  She  reports that she has quit smoking. She has never used smokeless tobacco. She reports that she does not drink alcohol or use drugs.    SUBJECTIVE  Graciela Galavn is a 54 y.o. female being seen in our office today for nose bleeds               Social History  She  reports that she has quit smoking. She has never used smokeless tobacco. She reports that she does not drink alcohol or use drugs.     History of the Present Illness   HPI Couple of weeks ago and had a cold. She was bleeding with blowing her nose on the left side primarily. Worse when it's dry. Doesn't matter if she's using saline or not. Hasn't seen ENT     Cyst on right thumb that occasionally itches but mostly doesn't bother her.     Was out in the sun last week of September and got back of her neck sunburned. Now has a patch of skin that is persistently bothering her.   Significant Past History  The following portions of the patient's history were reviewed and updated as appropriate:PMHroutine: Social history , Allergies, Current Medications, Active Problem List and Health Maintenance     Review of Systems   Constitutional: Negative for activity change, appetite change, chills, fatigue, fever and unexpected weight change.   HENT: Positive for  nosebleeds. Negative for congestion, ear pain, hearing loss, rhinorrhea and sore throat.    Eyes: Negative for pain, redness and visual disturbance.   Respiratory: Negative for cough, shortness of breath and wheezing.    Cardiovascular: Negative for chest pain, palpitations and leg swelling.   Gastrointestinal: Negative for abdominal pain, blood in stool, constipation, diarrhea, nausea and vomiting.   Endocrine: Negative for cold intolerance and heat intolerance.   Genitourinary: Negative for difficulty urinating, dysuria, frequency, hematuria, pelvic pain, urgency and vaginal discharge.   Musculoskeletal: Negative for arthralgias, back pain and joint swelling.   Skin: Negative for rash and wound.   Neurological: Negative for dizziness, weakness, numbness and headaches.   Hematological: Does not bruise/bleed easily.   Psychiatric/Behavioral: Negative for dysphoric mood, sleep disturbance and suicidal ideas. The patient is not nervous/anxious.    I have reviewed the ROS as documented by the MA. Natalie Kirkpatrick MA       OBJECTIVE   Vital Signs              BP Readings from Last 1 Encounters:   10/28/19 102/60          Wt Readings from Last 3 Encounters:   10/28/19 75.3 kg (166 lb)   01/11/19 78.5 kg (173 lb)   06/04/18 80.3 kg (177 lb)   Body mass index is 26 kg/m².    Physical Exam   Constitutional: She appears well-developed and well-nourished.   Musculoskeletal: She exhibits deformity (small ganglionic cyst lateral 1st metatarsal).   Psychiatric: She has a normal mood and affect. Her behavior is normal. Judgment and thought content normal.   Vitals reviewed.

## 2020-03-27 ENCOUNTER — TELEPHONE (OUTPATIENT)
Dept: FAMILY MEDICINE CLINIC | Facility: CLINIC | Age: 55
End: 2020-03-27

## 2020-03-27 DIAGNOSIS — G43.909 MIGRAINE WITHOUT STATUS MIGRAINOSUS, NOT INTRACTABLE, UNSPECIFIED MIGRAINE TYPE: Primary | ICD-10-CM

## 2020-03-27 NOTE — TELEPHONE ENCOUNTER
PT is calling to [facility]. Pt stated she has been having some really bad migraines and non of the over the counter medications she has been using is working.         Verified pharmacy         Pt can be contacted at [phone]

## 2020-03-27 NOTE — TELEPHONE ENCOUNTER
Not on current medication list and does not look like it has ever been given to patient.  Please advise

## 2020-03-27 NOTE — TELEPHONE ENCOUNTER
Patient states that  has given her samples of the IMETRIX before and that's how she knows it works and she likes it, and wants to start taking the script,,, does not want to come in to the office right now,

## 2020-03-30 RX ORDER — SUMATRIPTAN 100 MG/1
100 TABLET, FILM COATED ORAL
Qty: 12 TABLET | Refills: 2 | Status: SHIPPED | OUTPATIENT
Start: 2020-03-30 | End: 2022-05-09

## 2020-04-23 DIAGNOSIS — I10 BENIGN ESSENTIAL HYPERTENSION: ICD-10-CM

## 2020-04-24 RX ORDER — METOPROLOL SUCCINATE 50 MG/1
TABLET, EXTENDED RELEASE ORAL
Qty: 30 TABLET | Refills: 0 | Status: SHIPPED | OUTPATIENT
Start: 2020-04-24 | End: 2020-06-09

## 2020-06-08 DIAGNOSIS — I10 BENIGN ESSENTIAL HYPERTENSION: ICD-10-CM

## 2020-06-09 RX ORDER — METOPROLOL SUCCINATE 50 MG/1
TABLET, EXTENDED RELEASE ORAL
Qty: 15 TABLET | Refills: 0 | Status: SHIPPED | OUTPATIENT
Start: 2020-06-09 | End: 2020-06-26 | Stop reason: SDUPTHER

## 2020-06-26 DIAGNOSIS — I10 BENIGN ESSENTIAL HYPERTENSION: ICD-10-CM

## 2020-06-26 RX ORDER — METOPROLOL SUCCINATE 50 MG/1
50 TABLET, EXTENDED RELEASE ORAL DAILY
Qty: 30 TABLET | Refills: 0 | Status: SHIPPED | OUTPATIENT
Start: 2020-06-26 | End: 2020-06-29 | Stop reason: SDUPTHER

## 2020-06-26 NOTE — TELEPHONE ENCOUNTER
Caller: Graciela Galvan    Relationship: Self    Best call back number: [phone]    Medication needed:   Requested Prescriptions     Pending Prescriptions Disp Refills   • metoprolol succinate XL (TOPROL-XL) 50 MG 24 hr tablet 15 tablet 0       When do you need the refill by: 06/29/2020    What details did the patient provide when requesting the medication: SHE WAS TOLD BY PHARMACY THAT SHE WOULD NEED AN APPT BUT SHE FEELS THIS IS WORKING WELL FOR HER. SHE WILL SCHEDULE AN APPT, IF NEEDED.    Does the patient have less than a 3 day supply:  [x] Yes  [x] No    What is the patient's preferred pharmacy: [address] [phone] [facility] [phone] FX

## 2020-06-29 ENCOUNTER — RESULTS ENCOUNTER (OUTPATIENT)
Dept: FAMILY MEDICINE CLINIC | Facility: CLINIC | Age: 55
End: 2020-06-29

## 2020-06-29 ENCOUNTER — TELEMEDICINE (OUTPATIENT)
Dept: FAMILY MEDICINE CLINIC | Facility: CLINIC | Age: 55
End: 2020-06-29

## 2020-06-29 VITALS — HEIGHT: 67 IN | WEIGHT: 180 LBS | BODY MASS INDEX: 28.25 KG/M2

## 2020-06-29 DIAGNOSIS — Z12.11 ENCOUNTER FOR SCREENING FOR MALIGNANT NEOPLASM OF COLON: ICD-10-CM

## 2020-06-29 DIAGNOSIS — Z12.11 ENCOUNTER FOR SCREENING FOR MALIGNANT NEOPLASM OF COLON: Primary | ICD-10-CM

## 2020-06-29 DIAGNOSIS — Z01.89 ROUTINE LAB DRAW: ICD-10-CM

## 2020-06-29 DIAGNOSIS — I10 BENIGN ESSENTIAL HYPERTENSION: ICD-10-CM

## 2020-06-29 PROCEDURE — 99213 OFFICE O/P EST LOW 20 MIN: CPT | Performed by: FAMILY MEDICINE

## 2020-06-29 RX ORDER — METOPROLOL SUCCINATE 50 MG/1
50 TABLET, EXTENDED RELEASE ORAL DAILY
Qty: 30 TABLET | Refills: 5 | Status: SHIPPED | OUTPATIENT
Start: 2020-06-29 | End: 2021-01-19

## 2020-06-29 NOTE — PROGRESS NOTES
ASSESSMENT AND PLAN     Problem List Items Addressed This Visit        Cardiovascular and Mediastinum    Benign essential hypertension    Overview     Nida 6/4/2018  BP is controlled well. She will recheck in six months. She will continue present meds.         Relevant Medications    metoprolol succinate XL (TOPROL-XL) 50 MG 24 hr tablet      Other Visit Diagnoses     Encounter for screening for malignant neoplasm of colon    -  Primary    Relevant Orders    Cologuard - Stool, Per Rectum    Routine lab draw        Relevant Orders    Hemoglobin A1c    CBC (No Diff)    Comprehensive Metabolic Panel    Lipid Panel With LDL / HDL Ratio    TSH        Return in about 6 months (around 12/29/2020).  Patient was given instructions and counseling regarding her condition or for health maintenance advice. Please see specific information pulled into the AVS if appropriate.        RACHEL Galvan is a 54 y.o. female being seen in our office today for Hypertension               Social History  She  reports that she has quit smoking. She has never used smokeless tobacco. She reports that she drinks alcohol. She reports that she does not use drugs.    History of the Present Illness   HPI  BP's have been good. She has been checking at home. She needs lab and will come in for those.   Significant Past History  The following portions of the patient's history were reviewed and updated as appropriate:PMHroutine: Social history , Allergies, Current Medications, Active Problem List and Health Maintenance    Review of Systems   Constitutional: Negative for fatigue, fever and unexpected weight change.   Respiratory: Negative for cough and shortness of breath.    Cardiovascular: Negative for chest pain.   Psychiatric/Behavioral: Negative for dysphoric mood. The patient is not nervous/anxious.    I have reviewed the ROS as documented by the MA. Lissette Sesay MD      OBJECTIVE  Vital Signs          BP Readings from Last 1  Encounters:   10/28/19 102/60     Wt Readings from Last 3 Encounters:   06/29/20 81.6 kg (180 lb)   10/28/19 75.3 kg (166 lb)   01/11/19 78.5 kg (173 lb)   Body mass index is 28.19 kg/m².     Physical Exam   Constitutional: She appears well-developed and well-nourished.   Psychiatric: She has a normal mood and affect. Her behavior is normal. Judgment and thought content normal.   Vitals reviewed.    Data Reviewed

## 2020-07-06 ENCOUNTER — RESULTS ENCOUNTER (OUTPATIENT)
Dept: FAMILY MEDICINE CLINIC | Facility: CLINIC | Age: 55
End: 2020-07-06

## 2020-07-06 DIAGNOSIS — Z01.89 ROUTINE LAB DRAW: ICD-10-CM

## 2020-07-08 LAB
ERYTHROCYTE [DISTWIDTH] IN BLOOD BY AUTOMATED COUNT: 11.8 % (ref 12.3–15.4)
HCT VFR BLD AUTO: 42.9 % (ref 34–46.6)
HGB BLD-MCNC: 14.7 G/DL (ref 12–15.9)
MCH RBC QN AUTO: 32.7 PG (ref 26.6–33)
MCHC RBC AUTO-ENTMCNC: 34.3 G/DL (ref 31.5–35.7)
MCV RBC AUTO: 95.5 FL (ref 79–97)
PLATELET # BLD AUTO: 292 10*3/MM3 (ref 140–450)
RBC # BLD AUTO: 4.49 10*6/MM3 (ref 3.77–5.28)
WBC # BLD AUTO: 4.28 10*3/MM3 (ref 3.4–10.8)

## 2020-07-09 LAB
ALBUMIN SERPL-MCNC: 4.4 G/DL (ref 3.5–5.2)
ALBUMIN/GLOB SERPL: 1.2 G/DL
ALP SERPL-CCNC: 65 U/L (ref 39–117)
ALT SERPL-CCNC: 49 U/L (ref 1–33)
AST SERPL-CCNC: 63 U/L (ref 1–32)
BILIRUB SERPL-MCNC: 0.5 MG/DL (ref 0–1.2)
BUN SERPL-MCNC: 8 MG/DL (ref 6–20)
BUN/CREAT SERPL: 8.8 (ref 7–25)
CALCIUM SERPL-MCNC: 10.1 MG/DL (ref 8.6–10.5)
CHLORIDE SERPL-SCNC: 103 MMOL/L (ref 98–107)
CHOLEST SERPL-MCNC: 171 MG/DL (ref 0–200)
CO2 SERPL-SCNC: 25 MMOL/L (ref 22–29)
CREAT SERPL-MCNC: 0.91 MG/DL (ref 0.57–1)
GLOBULIN SER CALC-MCNC: 3.8 GM/DL
GLUCOSE SERPL-MCNC: 106 MG/DL (ref 65–99)
HBA1C MFR BLD: 5.1 % (ref 4.8–5.6)
HDLC SERPL-MCNC: 47 MG/DL (ref 40–60)
LDLC SERPL CALC-MCNC: 93 MG/DL (ref 0–100)
LDLC/HDLC SERPL: 1.98 {RATIO}
POTASSIUM SERPL-SCNC: 4.6 MMOL/L (ref 3.5–5.2)
PROT SERPL-MCNC: 8.2 G/DL (ref 6–8.5)
SODIUM SERPL-SCNC: 136 MMOL/L (ref 136–145)
TRIGL SERPL-MCNC: 155 MG/DL (ref 0–150)
TSH SERPL DL<=0.005 MIU/L-ACNC: 4.87 UIU/ML (ref 0.27–4.2)
VLDLC SERPL CALC-MCNC: 31 MG/DL

## 2020-09-21 ENCOUNTER — OFFICE VISIT (OUTPATIENT)
Dept: FAMILY MEDICINE CLINIC | Facility: CLINIC | Age: 55
End: 2020-09-21

## 2020-09-21 VITALS
TEMPERATURE: 97.8 F | BODY MASS INDEX: 29.03 KG/M2 | OXYGEN SATURATION: 98 % | WEIGHT: 185 LBS | HEART RATE: 67 BPM | SYSTOLIC BLOOD PRESSURE: 158 MMHG | RESPIRATION RATE: 14 BRPM | DIASTOLIC BLOOD PRESSURE: 90 MMHG | HEIGHT: 67 IN

## 2020-09-21 DIAGNOSIS — R79.89 ELEVATED LFTS: Primary | ICD-10-CM

## 2020-09-21 DIAGNOSIS — F10.10 ALCOHOL ABUSE: ICD-10-CM

## 2020-09-21 DIAGNOSIS — R16.0 HEPATOMEGALY: ICD-10-CM

## 2020-09-21 DIAGNOSIS — R10.9 ABDOMINAL CRAMPING: ICD-10-CM

## 2020-09-21 PROCEDURE — 99214 OFFICE O/P EST MOD 30 MIN: CPT | Performed by: FAMILY MEDICINE

## 2020-09-21 NOTE — PROGRESS NOTES
Assessment and Plan:     Problem List Items Addressed This Visit        Other    Alcohol abuse    Overview     Nida 9/21/2020   She is aware that she should stop drinking.           Other Visit Diagnoses     Elevated LFTs    -  Primary    Relevant Orders    US Abdomen Complete    Hepatomegaly        Relevant Orders    US Abdomen Complete    Abdominal cramping            Return for Annual physical.  Patient was given instructions and counseling regarding her condition or for health maintenance advice. Please see specific information pulled into the AVS if appropriate.        RACHEL Galvan is a 54 y.o. female being seen today for Abdominal Pain and Headache               Social History  She  reports that she has quit smoking. She has never used smokeless tobacco. She reports current alcohol use. She reports that she does not use drugs.    History of the Present Illness   HPI Has had migraines in the past, since puberty, but now she has the migraines and abdominal cramping at the same time. She has never had stomach issues with her HAs before. Began three months ago. Eating does not affect. She has been drinking alcohol, a lot of alcohol. She quit but has resumed, since she didn't feel better on the alcohol after a week of not drinking. She is on two anti-depressant medications.  Her psychiatrist is aware of the amount of alcohol she is consuming.  No diarrhea that is not alcohol related. Stress makes the cramping worse. 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   Significant Past History  The following portions of the patient's history were reviewed and updated as appropriate:PMHroutine: Social history , Allergies, Current Medications, Active Problem List and Health Maintenance    Review of Systems   Constitutional: Negative for activity change, appetite change, chills, fatigue, fever and unexpected weight  change.   HENT: Negative for congestion, ear pain, hearing loss, nosebleeds, rhinorrhea and sore throat.    Eyes: Negative for pain, redness and visual disturbance.   Respiratory: Negative for cough, shortness of breath and wheezing.    Cardiovascular: Negative for chest pain, palpitations and leg swelling.   Gastrointestinal: Positive for abdominal pain. Negative for blood in stool, constipation, diarrhea, nausea and vomiting.   Endocrine: Negative for cold intolerance and heat intolerance.   Genitourinary: Negative for difficulty urinating, dysuria, frequency, hematuria, pelvic pain, urgency and vaginal discharge.   Musculoskeletal: Negative for arthralgias, back pain and joint swelling.   Skin: Negative for rash and wound.   Neurological: Positive for headaches. Negative for dizziness, weakness and numbness.   Hematological: Does not bruise/bleed easily.   Psychiatric/Behavioral: Negative for dysphoric mood, sleep disturbance and suicidal ideas. The patient is not nervous/anxious.    I have reviewed the ROS as documented by the MA. Lissette Sesay MD      OBJECTIVE  Vital Signs          BP Readings from Last 1 Encounters:   09/21/20 158/90     Wt Readings from Last 3 Encounters:   09/21/20 83.9 kg (185 lb)   06/29/20 81.6 kg (180 lb)   10/28/19 75.3 kg (166 lb)   Body mass index is 28.98 kg/m².     Physical Exam  Vitals signs reviewed.   Constitutional:       Appearance: Normal appearance. She is well-developed.   Abdominal:      General: Abdomen is flat.      Palpations: Abdomen is soft. There is hepatomegaly. There is no fluid wave.      Tenderness: There is abdominal tenderness in the right lower quadrant.      Comments: Upper RLQ, bottom edge of where I think the liver is from percussion.   Neurological:      Mental Status: She is alert.   Psychiatric:         Behavior: Behavior normal.         Thought Content: Thought content normal.         Judgment: Judgment normal.             Result Value Ref Range     WBC 4.28 3.40 - 10.80 10*3/mm3    RBC 4.49 3.77 - 5.28 10*6/mm3    Hemoglobin 14.7 12.0 - 15.9 g/dL    Hematocrit 42.9 34.0 - 46.6 %    MCV 95.5 79.0 - 97.0 fL    MCH 32.7 26.6 - 33.0 pg    MCHC 34.3 31.5 - 35.7 g/dL    RDW 11.8 (L) 12.3 - 15.4 %    Platelets 292 140 - 450 10*3/mm3   Comprehensive Metabolic Panel   Result Value Ref Range    Glucose 106 (H) 65 - 99 mg/dL    BUN 8 6 - 20 mg/dL    Creatinine 0.91 0.57 - 1.00 mg/dL    eGFR Non African Am 64 >60 mL/min/1.73    BUN/Creatinine Ratio 8.8 7.0 - 25.0    Sodium 136 136 - 145 mmol/L    Potassium 4.6 3.5 - 5.2 mmol/L    Chloride 103 98 - 107 mmol/L    Total CO2 25.0 22.0 - 29.0 mmol/L    Calcium 10.1 8.6 - 10.5 mg/dL    Total Protein 8.2 6.0 - 8.5 g/dL    Albumin 4.40 3.50 - 5.20 g/dL    Globulin 3.8 gm/dL    A/G Ratio 1.2 g/dL    Total Bilirubin 0.5 0.0 - 1.2 mg/dL    Alkaline Phosphatase 65 39 - 117 U/L    AST (SGOT) 63 (H) 1 - 32 U/L    ALT (SGPT) 49 (H) 1 - 33 U/L

## 2020-09-29 ENCOUNTER — HOSPITAL ENCOUNTER (OUTPATIENT)
Dept: ULTRASOUND IMAGING | Facility: HOSPITAL | Age: 55
Discharge: HOME | End: 2020-09-29
Admitting: FAMILY MEDICINE

## 2020-09-29 DIAGNOSIS — R79.89 ELEVATED LFTS: ICD-10-CM

## 2020-09-29 DIAGNOSIS — R16.0 HEPATOMEGALY: ICD-10-CM

## 2020-09-29 PROCEDURE — 76700 US EXAM ABDOM COMPLETE: CPT

## 2020-10-02 ENCOUNTER — OFFICE VISIT (OUTPATIENT)
Dept: FAMILY MEDICINE CLINIC | Facility: CLINIC | Age: 55
End: 2020-10-02

## 2020-10-02 VITALS
DIASTOLIC BLOOD PRESSURE: 70 MMHG | BODY MASS INDEX: 28.88 KG/M2 | WEIGHT: 184 LBS | RESPIRATION RATE: 14 BRPM | TEMPERATURE: 98.7 F | SYSTOLIC BLOOD PRESSURE: 122 MMHG | HEIGHT: 67 IN | OXYGEN SATURATION: 98 % | HEART RATE: 78 BPM

## 2020-10-02 DIAGNOSIS — Z01.419 ROUTINE GYNECOLOGICAL EXAMINATION: ICD-10-CM

## 2020-10-02 DIAGNOSIS — Z00.00 HEALTHCARE MAINTENANCE: Primary | ICD-10-CM

## 2020-10-02 DIAGNOSIS — F10.10 ALCOHOL ABUSE: ICD-10-CM

## 2020-10-02 PROCEDURE — 99396 PREV VISIT EST AGE 40-64: CPT | Performed by: FAMILY MEDICINE

## 2020-10-02 NOTE — PROGRESS NOTES
Assessment and Plan:     Problem List Items Addressed This Visit        Other    Alcohol abuse    Overview     Jose Alejandrolb 10/2/2020   She is aware that she should stop drinking. She is hoping to go to AA with a friend in the next week or so. She is aware of the importance of stopping drinking.            Other Visit Diagnoses     Healthcare maintenance    -  Primary    Routine gynecological examination        Relevant Orders    PapIG, CtNg, HPV, Rfx 16 / 18        Patient was given instructions and counseling regarding her condition or for health maintenance advice. Please see specific information pulled into the AVS if appropriate.        RACHEL Galvan is a 54 y.o. female being seen today for Annual Exam               Social History  She  reports that she has quit smoking. She has never used smokeless tobacco. She reports current alcohol use. She reports that she does not use drugs.    History of the Present Illness   HPI Annual Exam: Patient presents for annual exam.  findings; last pap: approximate date 5 years ago and was normal  Last mammo: approximate date 2019 and was normal   The patient is not  still having menses.Last menstrual period: >1 year ago.  History; colonoscopy: Last colonoscopy: cologuard 0 years ago    The patient wears seatbelts: yes.  Practicing social distancing, handwashing and keeping hands from face? yes  She is eating a healthy diet but she is drinking too much.   The patient regularly exercises: no. She does none   This patient has ever been tested for HepC: yes    She does see a dentist regularly.   Is she using sunscreen: no  Her immunization are up-to-date.    Significant Past History  The following portions of the patient's history were reviewed and updated as appropriate:PMHroutine: Social history , Past Medical History, Surgical history , Allergies, Current Medications, Active Problem List, Family History and Health Maintenance    Review of Systems   Constitutional:  Negative for activity change, appetite change, chills, fatigue, fever and unexpected weight change.   HENT: Negative for congestion, ear pain, hearing loss, nosebleeds, rhinorrhea and sore throat.    Eyes: Negative for pain, redness and visual disturbance.   Respiratory: Negative for cough, shortness of breath and wheezing.    Cardiovascular: Negative for chest pain, palpitations and leg swelling.   Gastrointestinal: Negative for abdominal pain, blood in stool, constipation, diarrhea, nausea and vomiting.   Endocrine: Negative for cold intolerance and heat intolerance.   Genitourinary: Negative for difficulty urinating, dysuria, frequency, hematuria, pelvic pain, urgency and vaginal discharge.   Musculoskeletal: Negative for arthralgias, back pain and joint swelling.   Skin: Negative for rash and wound.   Neurological: Negative for dizziness, weakness, numbness and headaches.   Hematological: Does not bruise/bleed easily.   Psychiatric/Behavioral: Negative for dysphoric mood, sleep disturbance and suicidal ideas. The patient is not nervous/anxious.    I have reviewed the ROS as documented by the MA. Lissette Sesay MD      OBJECTIVE  Vital Signs          BP Readings from Last 1 Encounters:   10/02/20 122/70     Wt Readings from Last 3 Encounters:   10/02/20 83.5 kg (184 lb)   09/21/20 83.9 kg (185 lb)   06/29/20 81.6 kg (180 lb)   Body mass index is 28.82 kg/m².     Physical Exam  Vitals signs reviewed.   Constitutional:       General: She is not in acute distress.     Appearance: She is well-developed.   HENT:      Head: Normocephalic and atraumatic.      Right Ear: Tympanic membrane, ear canal and external ear normal.      Left Ear: Tympanic membrane, ear canal and external ear normal.   Eyes:      Conjunctiva/sclera: Conjunctivae normal.   Neck:      Musculoskeletal: Normal range of motion and neck supple.      Thyroid: No thyromegaly.      Trachea: No tracheal deviation.   Cardiovascular:      Rate and Rhythm:  Normal rate and regular rhythm.      Heart sounds: Normal heart sounds. No murmur. No gallop.    Pulmonary:      Effort: Pulmonary effort is normal. No respiratory distress.      Breath sounds: Normal breath sounds. No wheezing or rales.   Chest:      Chest wall: No tenderness.      Breasts: Breasts are symmetrical.         Right: No mass, nipple discharge or skin change.         Left: No mass, nipple discharge or skin change.   Abdominal:      General: Bowel sounds are normal. There is no distension.      Palpations: Abdomen is soft.      Tenderness: There is no abdominal tenderness.   Genitourinary:     Exam position: Supine.      Labia:         Right: No rash or lesion.         Left: No rash or lesion.       Vagina: Normal. No vaginal discharge, erythema, tenderness or bleeding.      Cervix: No cervical motion tenderness, discharge or friability.      Uterus: Not enlarged and not tender.       Adnexa:         Right: No mass, tenderness or fullness.          Left: No mass, tenderness or fullness.     Musculoskeletal:         General: No deformity.   Lymphadenopathy:      Cervical: No cervical adenopathy.   Skin:     General: Skin is warm and dry.      Findings: No rash.   Neurological:      Mental Status: She is alert and oriented to person, place, and time.   Psychiatric:         Behavior: Behavior normal.         Thought Content: Thought content normal.         Judgment: Judgment normal.

## 2020-10-07 LAB
C TRACH RRNA CVX QL NAA+PROBE: NEGATIVE
CYTOLOGIST CVX/VAG CYTO: NORMAL
CYTOLOGY CVX/VAG DOC CYTO: NORMAL
CYTOLOGY CVX/VAG DOC THIN PREP: NORMAL
DX ICD CODE: NORMAL
HIV 1 & 2 AB SER-IMP: NORMAL
HPV I/H RISK 1 DNA CVX QL PROBE+SIG AMP: NEGATIVE
N GONORRHOEA RRNA CVX QL NAA+PROBE: NEGATIVE
OTHER STN SPEC: NORMAL
STAT OF ADQ CVX/VAG CYTO-IMP: NORMAL

## 2020-10-19 ENCOUNTER — TELEPHONE (OUTPATIENT)
Dept: FAMILY MEDICINE CLINIC | Facility: CLINIC | Age: 55
End: 2020-10-19

## 2020-11-13 ENCOUNTER — TELEPHONE (OUTPATIENT)
Dept: FAMILY MEDICINE CLINIC | Facility: CLINIC | Age: 55
End: 2020-11-13

## 2020-11-13 NOTE — TELEPHONE ENCOUNTER
Spoke to pt and refaxed [facility] paperwork from 10/2020 to [facility].

## 2020-11-13 NOTE — TELEPHONE ENCOUNTER
PATIENT WOULD LIKE YOU TO CALL: SHE STATES THEY STILL DID NOT RECEIVE THIS .     MARIA D GRIGGS  [phone]

## 2020-11-13 NOTE — TELEPHONE ENCOUNTER
Patient would like a callback to confirm when we sent [facility] paperwork to her place of employment. Said we might possibly have to re-send.    Callback# [phone]

## 2020-11-16 ENCOUNTER — TELEPHONE (OUTPATIENT)
Dept: FAMILY MEDICINE CLINIC | Facility: CLINIC | Age: 55
End: 2020-11-16

## 2020-11-16 NOTE — TELEPHONE ENCOUNTER
PT CALLED TO LET TAD KNOW THAT HER WORK DOES NOT HAVE THE [facility] PAPERWORK THAT WAS SUPPOSED TO BE SENT OVER.    SHE CONFIRMED THAT THE FAX NUMBER [phone]    PLEASE SEND OVER ASAP.    CALLBACK NUMBER: [phone]

## 2021-01-18 DIAGNOSIS — I10 BENIGN ESSENTIAL HYPERTENSION: ICD-10-CM

## 2021-01-19 RX ORDER — METOPROLOL SUCCINATE 50 MG/1
TABLET, EXTENDED RELEASE ORAL
Qty: 90 TABLET | Refills: 0 | Status: SHIPPED | OUTPATIENT
Start: 2021-01-19 | End: 2022-05-09

## 2021-03-24 ENCOUNTER — BULK ORDERING (OUTPATIENT)
Dept: CASE MANAGEMENT | Facility: OTHER | Age: 56
End: 2021-03-24

## 2021-03-24 DIAGNOSIS — Z23 IMMUNIZATION DUE: ICD-10-CM

## 2021-10-21 ENCOUNTER — TELEPHONE (OUTPATIENT)
Dept: FAMILY MEDICINE CLINIC | Facility: CLINIC | Age: 56
End: 2021-10-21

## 2021-10-21 NOTE — TELEPHONE ENCOUNTER
Patient informed will need referral to hand surgery or [place] will call back to let us know

## 2021-10-21 NOTE — TELEPHONE ENCOUNTER
Caller: Graciela Galvan     Best call back number:[phone]     What is your medical concern? PATIENT STATES SHE HAS A PIECE OF GLASS THAT WENT UNDER THE SKIN OF HER PALM ABOUT 2 MONTHS AGO. PATIENT STATES IT IS NOW IRRITATING HER AND WOULD LIKE IT REMOVED. PATIENT IS WONDERING IF THIS IS SOMETHING DR OLSEN CAN DO

## 2022-01-03 ENCOUNTER — TELEPHONE (OUTPATIENT)
Dept: FAMILY MEDICINE CLINIC | Facility: CLINIC | Age: 57
End: 2022-01-03

## 2022-01-03 DIAGNOSIS — Z12.83 SCREENING EXAM FOR SKIN CANCER: Primary | ICD-10-CM

## 2022-01-03 NOTE — TELEPHONE ENCOUNTER
Pt called requesting a referral for dermatologist. Pt states she would like to go to [facility]. Pt states that provider she had seen there previously no longer takes her insurance, but others in the practice do. Please advise.    Last OV: 10/2/2020

## 2022-01-04 NOTE — TELEPHONE ENCOUNTER
Called pt to share message and she stated that her insurance requires the referral. Pt has [employer].

## 2022-01-17 ENCOUNTER — TELEMEDICINE (OUTPATIENT)
Dept: FAMILY MEDICINE CLINIC | Facility: CLINIC | Age: 57
End: 2022-01-17

## 2022-01-17 VITALS — BODY MASS INDEX: 29.73 KG/M2 | HEIGHT: 66 IN | WEIGHT: 185 LBS

## 2022-01-17 DIAGNOSIS — J06.9 ACUTE URI: Primary | ICD-10-CM

## 2022-01-17 PROCEDURE — 99213 OFFICE O/P EST LOW 20 MIN: CPT | Performed by: FAMILY MEDICINE

## 2022-01-17 NOTE — PROGRESS NOTES
Assessment and Plan     Problem List Items Addressed This Visit     None      Visit Diagnoses     Acute URI vs allergies    -  Primary    Stop Afrin, use nasonex, head down, allegra. Stop other meds. Could be recovering from COVID (disc rapid test negative rate). Either way should feel better soon        Call Friday no improvement.   Patient was given instructions and counseling regarding her condition or for health maintenance advice. Please see specific information pulled into the AVS if appropriate.        Graciela is a 56 y.o. being seen today via video for  Nasal Congestion (and sneezing for 3 weeks) and Fatigue   Subjective   History of the Present Illness   She has had symptoms since the last week of December. One nostril works and one doesn't. She's been sick and had a cough and stuff. Did a couple of rapid COVID tests that were negative. Using nyquil, dayquil, some afrin but not every day. Her ex- had a long time cold, she's tired. She was supposed to go to [place] but postponed the trip.   Social History  She  reports that she quit smoking about 22 years ago. Her smoking use included cigarettes. She has a 30.00 pack-year smoking history. She has never used smokeless tobacco. She reports current alcohol use. She reports that she does not use drugs.  Objective   Vital Signs        BP Readings from Last 1 Encounters:   02/28/21 112/80     Wt Readings from Last 3 Encounters:   01/17/22 83.9 kg (185 lb)   02/28/21 85.3 kg (188 lb)   10/02/20 83.5 kg (184 lb)   Body mass index is 29.86 kg/m².     Physical Exam  Vitals reviewed.   Constitutional:       Appearance: Normal appearance. She is well-developed.   HENT:      Nose: Congestion present.      Comments: A little redness around the nares  Neurological:      Mental Status: She is alert.   Psychiatric:         Behavior: Behavior normal.         Thought Content: Thought content normal.         Judgment: Judgment normal.         {SnapShot  Notes  Encounters   Result Review  Labs  Imaging  Meds  Media :23}

## 2022-05-09 ENCOUNTER — OFFICE VISIT (OUTPATIENT)
Dept: FAMILY MEDICINE CLINIC | Facility: CLINIC | Age: 57
End: 2022-05-09

## 2022-05-09 VITALS
HEART RATE: 78 BPM | WEIGHT: 179 LBS | DIASTOLIC BLOOD PRESSURE: 78 MMHG | BODY MASS INDEX: 28.77 KG/M2 | HEIGHT: 66 IN | RESPIRATION RATE: 18 BRPM | OXYGEN SATURATION: 98 % | SYSTOLIC BLOOD PRESSURE: 130 MMHG

## 2022-05-09 DIAGNOSIS — Z12.31 ENCOUNTER FOR SCREENING MAMMOGRAM FOR MALIGNANT NEOPLASM OF BREAST: ICD-10-CM

## 2022-05-09 DIAGNOSIS — H93.11 CLICKING TINNITUS OF RIGHT EAR: ICD-10-CM

## 2022-05-09 DIAGNOSIS — K21.9 GASTROESOPHAGEAL REFLUX DISEASE, UNSPECIFIED WHETHER ESOPHAGITIS PRESENT: Primary | ICD-10-CM

## 2022-05-09 DIAGNOSIS — L71.9 ACNE ROSACEA: ICD-10-CM

## 2022-05-09 PROCEDURE — 99214 OFFICE O/P EST MOD 30 MIN: CPT | Performed by: FAMILY MEDICINE

## 2022-05-09 RX ORDER — TRAZODONE HYDROCHLORIDE 100 MG/1
TABLET ORAL
Qty: 25 TABLET | Refills: 0 | Status: SHIPPED | OUTPATIENT
Start: 2022-05-09 | End: 2022-05-10 | Stop reason: SDUPTHER

## 2022-05-09 RX ORDER — ADAPALENE AND BENZOYL PEROXIDE 3; 25 MG/G; MG/G
1 GEL TOPICAL DAILY
Qty: 60 G | Refills: 5 | Status: SHIPPED | OUTPATIENT
Start: 2022-05-09

## 2022-05-09 NOTE — PROGRESS NOTES
Assessment and Plan     Problem List Items Addressed This Visit        Gastrointestinal Abdominal     Gastroesophageal reflux disease - Primary    Overview     Nida 5/10/2022   Patient is having significant issues with her reflux and I am afraid that her alcohol intake, albeit reduced, is part of the problem.  I have discussed with her the increased incidence of esophageal cancer with excess alcohol intake and think she should consider having an endoscopy.  Definitely continue the Nexium since it seems to be reducing her symptoms.           Relevant Medications    esomeprazole (nexIUM) 20 MG capsule       Skin    Acne rosacea    Relevant Medications    Adapalene-Benzoyl Peroxide (Epiduo Forte) 0.3-2.5 % gel      Other Visit Diagnoses     Encounter for screening mammogram for malignant neoplasm of breast        Relevant Orders    Mammo Screening Digital Tomosynthesis Bilateral With CAD    Clicking tinnitus of right ear        She can also check with him about her throat issues.    Relevant Orders    Ambulatory Referral to ENT (Otolaryngology)        Return for Annual physical.    Patient was given instructions and counseling regarding her condition or for health maintenance advice. Please see specific information pulled into the AVS if appropriate.        Graciela is a 56 y.o. being seen today for  Heartburn   Subjective   History of the Present Illness   Answers for HPI/ROS submitted by the patient on 5/8/2022  Please describe your symptoms.: Acid reflux  Have you had these symptoms before?: Yes  How long have you been having these symptoms?: Greater than 2 weeks  Please list any medications you are currently taking for this condition.: Nexium, Prilosec, pepcid ac, pepto bismol    Nexium works for her. She is sitting up in bed, she is still drinking alcohol. Stomach is not hurting. She does take ibuprofen rarely for her knees as well. No recent lab work.      Pyogenic granuloma under the right eyelid.  Social  History  She  reports that she quit smoking about 22 years ago. Her smoking use included cigarettes. She has a 30.00 pack-year smoking history. She has never used smokeless tobacco. She reports current alcohol use. She reports that she does not use drugs.  Objective   Vital Signs        BP Readings from Last 1 Encounters:   05/09/22 130/78     Wt Readings from Last 3 Encounters:   05/09/22 81.2 kg (179 lb)   01/17/22 83.9 kg (185 lb)   02/28/21 85.3 kg (188 lb)   Body mass index is 28.89 kg/m².     Physical Exam  Vitals reviewed.   Constitutional:       Appearance: Normal appearance. She is well-developed.   Neurological:      Mental Status: She is alert.   Psychiatric:         Behavior: Behavior normal.         Thought Content: Thought content normal.         Judgment: Judgment normal.

## 2022-05-10 ENCOUNTER — TELEPHONE (OUTPATIENT)
Dept: FAMILY MEDICINE CLINIC | Facility: CLINIC | Age: 57
End: 2022-05-10

## 2022-05-10 PROBLEM — K21.9 GASTROESOPHAGEAL REFLUX DISEASE: Status: ACTIVE | Noted: 2022-05-10

## 2022-05-10 RX ORDER — TRAZODONE HYDROCHLORIDE 100 MG/1
TABLET ORAL
Qty: 25 TABLET | Refills: 0 | Status: SHIPPED | OUTPATIENT
Start: 2022-05-10

## 2022-05-10 NOTE — TELEPHONE ENCOUNTER
Pharmacy Name: [facility] - [address] AT [facility]. & [address]  - [phone]  - [phone]      Pharmacy representative name: RANDY    Pharmacy representative phone number: [phone]    What medication are you calling in regards to: traZODone (DESYREL) 100 MG tablet    What question does the pharmacy have: RANDY STATES THAT THE PRESCRIPTION FOR THE MEDICATION DID NOT HAVE CLEAR INSTRUCTIONS ON HOW THE PATIENT IS TAKING IT, WHICH THEY NEED TO KNOW    Who is the provider that prescribed the medication: DR ANGI CAAL

## 2022-05-13 ENCOUNTER — HOSPITAL ENCOUNTER (OUTPATIENT)
Dept: MAMMOGRAPHY | Facility: HOSPITAL | Age: 57
Discharge: HOME | End: 2022-05-13
Admitting: FAMILY MEDICINE

## 2022-05-13 DIAGNOSIS — Z12.31 ENCOUNTER FOR SCREENING MAMMOGRAM FOR MALIGNANT NEOPLASM OF BREAST: ICD-10-CM

## 2022-05-13 PROCEDURE — 77063 BREAST TOMOSYNTHESIS BI: CPT

## 2022-05-13 PROCEDURE — 77067 SCR MAMMO BI INCL CAD: CPT

## 2022-07-05 DIAGNOSIS — R73.9 HYPERGLYCEMIA: ICD-10-CM

## 2022-07-05 DIAGNOSIS — E78.1 PRIMARY HYPERTRIGLYCERIDEMIA: ICD-10-CM

## 2022-07-05 DIAGNOSIS — I10 BENIGN ESSENTIAL HYPERTENSION: Primary | ICD-10-CM

## 2022-07-05 DIAGNOSIS — Z13.29 THYROID DISORDER SCREENING: ICD-10-CM

## 2022-07-06 LAB
ALBUMIN SERPL-MCNC: 4.1 G/DL (ref 3.8–4.9)
ALBUMIN/GLOB SERPL: 1.1 {RATIO} (ref 1.2–2.2)
ALP SERPL-CCNC: 68 IU/L (ref 44–121)
ALT SERPL-CCNC: 29 IU/L (ref 0–32)
AST SERPL-CCNC: 30 IU/L (ref 0–40)
BASOPHILS # BLD AUTO: 0 X10E3/UL (ref 0–0.2)
BASOPHILS NFR BLD AUTO: 1 %
BILIRUB SERPL-MCNC: 0.4 MG/DL (ref 0–1.2)
BUN SERPL-MCNC: 8 MG/DL (ref 6–24)
BUN/CREAT SERPL: 11 (ref 9–23)
CALCIUM SERPL-MCNC: 10.6 MG/DL (ref 8.7–10.2)
CHLORIDE SERPL-SCNC: 105 MMOL/L (ref 96–106)
CHOLEST SERPL-MCNC: 147 MG/DL (ref 100–199)
CHOLEST/HDLC SERPL: 4.5 RATIO (ref 0–4.4)
CO2 SERPL-SCNC: 23 MMOL/L (ref 20–29)
CREAT SERPL-MCNC: 0.74 MG/DL (ref 0.57–1)
EGFRCR SERPLBLD CKD-EPI 2021: 95 ML/MIN/1.73
EOSINOPHIL # BLD AUTO: 0.2 X10E3/UL (ref 0–0.4)
EOSINOPHIL NFR BLD AUTO: 5 %
ERYTHROCYTE [DISTWIDTH] IN BLOOD BY AUTOMATED COUNT: 11.6 % (ref 11.7–15.4)
GLOBULIN SER CALC-MCNC: 3.7 G/DL (ref 1.5–4.5)
GLUCOSE SERPL-MCNC: 99 MG/DL (ref 65–99)
HBA1C MFR BLD: 5.3 % (ref 4.8–5.6)
HCT VFR BLD AUTO: 41.4 % (ref 34–46.6)
HDLC SERPL-MCNC: 33 MG/DL
HGB BLD-MCNC: 14.1 G/DL (ref 11.1–15.9)
IMM GRANULOCYTES # BLD AUTO: 0 X10E3/UL (ref 0–0.1)
IMM GRANULOCYTES NFR BLD AUTO: 0 %
LDLC SERPL CALC-MCNC: 80 MG/DL (ref 0–99)
LYMPHOCYTES # BLD AUTO: 1.3 X10E3/UL (ref 0.7–3.1)
LYMPHOCYTES NFR BLD AUTO: 39 %
MCH RBC QN AUTO: 32.3 PG (ref 26.6–33)
MCHC RBC AUTO-ENTMCNC: 34.1 G/DL (ref 31.5–35.7)
MCV RBC AUTO: 95 FL (ref 79–97)
MONOCYTES # BLD AUTO: 0.5 X10E3/UL (ref 0.1–0.9)
MONOCYTES NFR BLD AUTO: 14 %
NEUTROPHILS # BLD AUTO: 1.4 X10E3/UL (ref 1.4–7)
NEUTROPHILS NFR BLD AUTO: 41 %
PLATELET # BLD AUTO: 207 X10E3/UL (ref 150–450)
POTASSIUM SERPL-SCNC: 4.3 MMOL/L (ref 3.5–5.2)
PROT SERPL-MCNC: 7.8 G/DL (ref 6–8.5)
RBC # BLD AUTO: 4.37 X10E6/UL (ref 3.77–5.28)
SODIUM SERPL-SCNC: 140 MMOL/L (ref 134–144)
TRIGL SERPL-MCNC: 203 MG/DL (ref 0–149)
TSH SERPL DL<=0.005 MIU/L-ACNC: 5.19 UIU/ML (ref 0.45–4.5)
VLDLC SERPL CALC-MCNC: 34 MG/DL (ref 5–40)
WBC # BLD AUTO: 3.4 X10E3/UL (ref 3.4–10.8)

## 2022-08-22 ENCOUNTER — OFFICE VISIT (OUTPATIENT)
Dept: FAMILY MEDICINE CLINIC | Facility: CLINIC | Age: 57
End: 2022-08-22

## 2022-08-22 VITALS
OXYGEN SATURATION: 99 % | SYSTOLIC BLOOD PRESSURE: 108 MMHG | BODY MASS INDEX: 27.97 KG/M2 | HEIGHT: 66 IN | RESPIRATION RATE: 18 BRPM | WEIGHT: 174 LBS | DIASTOLIC BLOOD PRESSURE: 70 MMHG | HEART RATE: 74 BPM

## 2022-08-22 DIAGNOSIS — F10.10 ALCOHOL ABUSE: ICD-10-CM

## 2022-08-22 DIAGNOSIS — Z00.00 HEALTHCARE MAINTENANCE: Primary | ICD-10-CM

## 2022-08-22 DIAGNOSIS — R73.9 HYPERGLYCEMIA: ICD-10-CM

## 2022-08-22 DIAGNOSIS — E03.8 SUBCLINICAL HYPOTHYROIDISM: ICD-10-CM

## 2022-08-22 DIAGNOSIS — E78.1 PRIMARY HYPERTRIGLYCERIDEMIA: ICD-10-CM

## 2022-08-22 DIAGNOSIS — I10 BENIGN ESSENTIAL HYPERTENSION: ICD-10-CM

## 2022-08-22 PROCEDURE — 3008F BODY MASS INDEX DOCD: CPT | Performed by: FAMILY MEDICINE

## 2022-08-22 PROCEDURE — 99396 PREV VISIT EST AGE 40-64: CPT | Performed by: FAMILY MEDICINE

## 2022-08-22 PROCEDURE — 2014F MENTAL STATUS ASSESS: CPT | Performed by: FAMILY MEDICINE

## 2022-08-22 NOTE — PROGRESS NOTES
Assessment and Plan     Patient Instructions    Problem List Items Addressed This Visit        Cardiac and Vasculature    RESOLVED: Benign essential hypertension    Overview     [facility] 8/22/2022  This is resolved with weight loss and stopping alcohol.            RESOLVED: Primary hypertriglyceridemia    Overview     [facility] 8/22/2022 this is resolved.  Probably related to her beer drinking prior.            Endocrine and Metabolic    RESOLVED: Hyperglycemia    Overview     [facility] 8/22/2022 This is resolved as well.            Subclinical hypothyroidism    Overview     [facility] 9/22/2017  Labs are drawn today.  TSH mildly elevated, in fact a little bit more than the last time.  We will check free T4 and T3 to see if she needs to have a thyroid supplement.TSH was 5.190 and done w/i the last year.             Relevant Orders    T3, Free    T4    T4, free       Mental Health    Alcohol abuse    Overview     [facility] 8/22/2022  She quit 65 days ago!!!!!             Other Visit Diagnoses     Healthcare maintenance    -  Primary             Return in about 1 year (around 8/22/2023) for lab with next visit, Annual physical.          Graciela is a 56 y.o. being seen today for  Annual Exam   Subjective   History of the Present Illness   Annual Exam-Postmenopausal:     Graciela Galvan 56 y.o.female presents for annual exam. She quit drinking 65 days ago after an second episode     Last pap: approximate date 2020 and was normal  Last mammogramapproximate date 5/2022 and was normal  The patient is not taking hormone replacement therapy.   The patient wears seatbelts: yes.    The patient has regular exercise: some.  Practicing social distancing, handwashing and keeping hands from face? yes  COVID vaccine UTD? yes  This patient has ever been tested for HepC: yes .   Dental Exam. up to date  history; colonoscopy/sigmoidoscopy: cologuard 2 years ago  Her immunization are up-to-date.  She is eating a healthy diet.   Labs results  were discussed    Social History  She  reports that she quit smoking about 22 years ago. Her smoking use included cigarettes. She has a 30.00 pack-year smoking history. She has never used smokeless tobacco. She reports previous alcohol use. She reports that she does not use drugs.  Objective   Vital Signs        BP Readings from Last 1 Encounters:   08/22/22 108/70     Wt Readings from Last 3 Encounters:   08/22/22 78.9 kg (174 lb)   06/19/22 81.6 kg (180 lb)   05/09/22 81.2 kg (179 lb)   Body mass index is 28.08 kg/m².     Physical Exam  Vitals reviewed.   Constitutional:       General: She is not in acute distress.     Appearance: She is well-developed.   HENT:      Head: Normocephalic and atraumatic.      Right Ear: Tympanic membrane, ear canal and external ear normal.      Left Ear: Tympanic membrane, ear canal and external ear normal.      Mouth/Throat:      Comments: Mask in place  Eyes:      Conjunctiva/sclera: Conjunctivae normal.   Neck:      Thyroid: No thyromegaly.      Trachea: No tracheal deviation.   Cardiovascular:      Rate and Rhythm: Normal rate and regular rhythm.      Heart sounds: Normal heart sounds.   Pulmonary:      Effort: Pulmonary effort is normal. No respiratory distress.      Breath sounds: Normal breath sounds. No wheezing or rales.   Chest:   Breasts: Breasts are symmetrical.      Right: No mass.      Left: No mass.       Abdominal:      General: Bowel sounds are normal. There is no distension.      Palpations: Abdomen is soft.      Tenderness: There is no abdominal tenderness.   Musculoskeletal:         General: No deformity.      Cervical back: Normal range of motion and neck supple.      Right lower leg: No edema.      Left lower leg: No edema.   Lymphadenopathy:      Cervical: No cervical adenopathy.   Skin:     General: Skin is warm and dry.   Neurological:      Mental Status: She is alert and oriented to person, place, and time.   Psychiatric:         Behavior: Behavior normal.          Thought Content: Thought content normal.         Judgment: Judgment normal.               Discussed labs!  TSH (07/05/2022 08:03)  Hemoglobin A1c (07/05/2022 08:03)  Lipid Panel With / Chol / HDL Ratio (07/05/2022 08:03)  Comprehensive Metabolic Panel (07/05/2022 08:03)  CBC & Differential (07/05/2022 08:03)    Patient was given instructions and counseling regarding her condition or for health maintenance advice. Please see specific information pulled into the AVS if appropriate.

## 2022-08-23 LAB
T3FREE SERPL-MCNC: 3.9 PG/ML (ref 2–4.4)
T4 FREE SERPL-MCNC: 0.98 NG/DL (ref 0.82–1.77)
T4 SERPL-MCNC: 9.5 UG/DL (ref 4.5–12)

## 2022-09-22 ENCOUNTER — TELEPHONE (OUTPATIENT)
Dept: FAMILY MEDICINE CLINIC | Facility: CLINIC | Age: 57
End: 2022-09-22

## 2022-09-22 NOTE — TELEPHONE ENCOUNTER
Patient called to get put back on her BP meds due it being high at home the last week ranging from 170's/90'sto 150's/80's.  Do you want to see her of just start her meds again

## 2022-09-23 DIAGNOSIS — I10 BENIGN ESSENTIAL HYPERTENSION: Primary | ICD-10-CM

## 2022-09-23 RX ORDER — METOPROLOL SUCCINATE 50 MG/1
50 TABLET, EXTENDED RELEASE ORAL DAILY
Qty: 90 TABLET | Refills: 1 | Status: SHIPPED | OUTPATIENT
Start: 2022-09-23 | End: 2023-04-05

## 2023-02-08 ENCOUNTER — OFFICE VISIT (OUTPATIENT)
Dept: FAMILY MEDICINE CLINIC | Facility: CLINIC | Age: 58
End: 2023-02-08
Payer: COMMERCIAL

## 2023-02-08 VITALS — TEMPERATURE: 98.5 F

## 2023-02-08 DIAGNOSIS — H10.31 ACUTE CONJUNCTIVITIS OF RIGHT EYE, UNSPECIFIED ACUTE CONJUNCTIVITIS TYPE: ICD-10-CM

## 2023-02-08 DIAGNOSIS — R50.9 FEVER, UNSPECIFIED FEVER CAUSE: ICD-10-CM

## 2023-02-08 DIAGNOSIS — J10.1 INFLUENZA A: Primary | ICD-10-CM

## 2023-02-08 LAB
EXPIRATION DATE: ABNORMAL
FLUAV AG UPPER RESP QL IA.RAPID: DETECTED
FLUBV AG UPPER RESP QL IA.RAPID: NOT DETECTED
INTERNAL CONTROL: ABNORMAL
Lab: ABNORMAL
SARS-COV-2 AG UPPER RESP QL IA.RAPID: NOT DETECTED

## 2023-02-08 PROCEDURE — 87428 SARSCOV & INF VIR A&B AG IA: CPT | Performed by: NURSE PRACTITIONER

## 2023-02-08 PROCEDURE — 99213 OFFICE O/P EST LOW 20 MIN: CPT | Performed by: NURSE PRACTITIONER

## 2023-02-08 RX ORDER — POLYMYXIN B SULFATE AND TRIMETHOPRIM 1; 10000 MG/ML; [USP'U]/ML
1 SOLUTION OPHTHALMIC EVERY 4 HOURS
Qty: 10 ML | Refills: 0 | Status: SHIPPED | OUTPATIENT
Start: 2023-02-08 | End: 2023-02-13

## 2023-02-08 NOTE — PROGRESS NOTES
Duyen Galvan is a 57 y.o. female.     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 Here for acute issues.     Acute cold sx, cough, fever x 3 days. R eye draining x 3 days. She is using alkseltzer cold and flu, tylenol and ibuprofen.     The following portions of the patient's history were reviewed and updated as appropriate: allergies, current medications, past family history, past medical history, past social history, past surgical history and problem list.    Review of Systems    Objective   Physical Exam  Vitals reviewed.   Constitutional:       Appearance: She is ill-appearing.   HENT:      Nose: Congestion and rhinorrhea present.      Mouth/Throat:      Mouth: Mucous membranes are moist.      Pharynx: Posterior oropharyngeal erythema present. No oropharyngeal exudate.   Eyes:      General:         Right eye: Discharge present.      Conjunctiva/sclera:      Right eye: Right conjunctiva is injected.      Pupils: Pupils are equal, round, and reactive to light.   Cardiovascular:      Rate and Rhythm: Normal rate and regular rhythm.      Pulses: Normal pulses.      Heart sounds: Normal heart sounds.   Pulmonary:      Effort: Pulmonary effort is normal.      Breath sounds: Normal breath sounds. No wheezing or rhonchi.   Abdominal:      General: Bowel sounds are normal.   Musculoskeletal:      Cervical back: Normal range of motion.   Skin:     General: Skin is warm.   Neurological:      General: No focal deficit present.      Mental Status: She is alert.         Vitals:    02/08/23 1446   Temp: 98.5 °F (36.9 °C)     There is no height or weight on file to calculate BMI.    Procedures    Assessment & Plan   Problems Addressed this Visit    None  Visit Diagnoses     Influenza A    -  Primary    Relevant Orders    POCT SARS-CoV-2 Antigen NILA (Completed)    Fever, unspecified fever cause        Relevant Orders    POCT SARS-CoV-2 Antigen NILA (Completed)    Acute conjunctivitis  of right eye, unspecified acute conjunctivitis type          Diagnoses       Codes Comments    Influenza A    -  Primary ICD-10-CM: J10.1  ICD-9-CM: 487.1     Fever, unspecified fever cause     ICD-10-CM: R50.9  ICD-9-CM: 780.60     Acute conjunctivitis of right eye, unspecified acute conjunctivitis type     ICD-10-CM: H10.31  ICD-9-CM: 372.00           Orders Placed This Encounter   Procedures   • POCT SARS-CoV-2 Antigen NILA     + Flu A- supportive care, hydrate, alternate tylenol.ibuprofen, mucinex as needed, rest and hydrate     R conjunctivitis- rx polytrim 1 gtt q4h prn , wipe from inside out, compresses as needed          Education provided in AVS   No follow-ups on file.

## 2023-02-13 ENCOUNTER — OFFICE VISIT (OUTPATIENT)
Dept: FAMILY MEDICINE CLINIC | Facility: CLINIC | Age: 58
End: 2023-02-13
Payer: COMMERCIAL

## 2023-02-13 VITALS
HEIGHT: 66 IN | OXYGEN SATURATION: 99 % | BODY MASS INDEX: 28.08 KG/M2 | DIASTOLIC BLOOD PRESSURE: 82 MMHG | SYSTOLIC BLOOD PRESSURE: 124 MMHG | HEART RATE: 86 BPM

## 2023-02-13 DIAGNOSIS — J02.9 ACUTE PHARYNGITIS, UNSPECIFIED ETIOLOGY: ICD-10-CM

## 2023-02-13 DIAGNOSIS — H60.502 ACUTE OTITIS EXTERNA OF LEFT EAR, UNSPECIFIED TYPE: Primary | ICD-10-CM

## 2023-02-13 PROCEDURE — 99213 OFFICE O/P EST LOW 20 MIN: CPT | Performed by: NURSE PRACTITIONER

## 2023-02-13 RX ORDER — CIPROFLOXACIN AND DEXAMETHASONE 3; 1 MG/ML; MG/ML
4 SUSPENSION/ DROPS AURICULAR (OTIC) 2 TIMES DAILY
Qty: 7.5 ML | Refills: 0 | Status: SHIPPED | OUTPATIENT
Start: 2023-02-13 | End: 2023-02-20

## 2023-02-13 NOTE — PROGRESS NOTES
Subjective   Graciela Galvan is a 57 y.o. female.     History of Present Illness   Dr Sesay pt. Had Flu A last week. She has been taking alkaseltzer cold/flu meds. Chronic cough with some thick mucus. Here for very painful L ear and throat pain. No fever. No wheezing,no SOA or pain with deep breathing. No fever, + fatigue and feels like she just cant better after Flu.     The following portions of the patient's history were reviewed and updated as appropriate: allergies, current medications, past family history, past medical history, past social history, past surgical history and problem list.    Review of Systems    Objective   Physical Exam  Vitals reviewed.   Constitutional:       Appearance: Normal appearance.   HENT:      Right Ear: Tympanic membrane normal.      Left Ear: Swelling and tenderness present.      Ears:      Comments: Canal bright pink, tender      Nose: Congestion and rhinorrhea present.      Mouth/Throat:      Mouth: Mucous membranes are moist.      Pharynx: Posterior oropharyngeal erythema present.   Eyes:      Pupils: Pupils are equal, round, and reactive to light.   Cardiovascular:      Rate and Rhythm: Normal rate and regular rhythm.      Pulses: Normal pulses.      Heart sounds: Normal heart sounds.   Pulmonary:      Effort: Pulmonary effort is normal.      Breath sounds: Normal breath sounds. No decreased breath sounds, wheezing or rhonchi.   Abdominal:      General: Bowel sounds are normal.   Musculoskeletal:         General: Normal range of motion.      Cervical back: Normal range of motion.   Lymphadenopathy:      Cervical: Cervical adenopathy present.   Skin:     General: Skin is warm.   Neurological:      General: No focal deficit present.      Mental Status: She is alert.         Vitals:    02/13/23 1319   BP: 124/82   Pulse: 86   SpO2: 99%     Body mass index is 28.08 kg/m².    Procedures    Assessment & Plan   Problems Addressed this Visit    None  Visit Diagnoses     Acute  otitis externa of left ear, unspecified type    -  Primary    Acute pharyngitis, unspecified etiology          Diagnoses       Codes Comments    Acute otitis externa of left ear, unspecified type    -  Primary ICD-10-CM: H60.502  ICD-9-CM: 380.10     Acute pharyngitis, unspecified etiology     ICD-10-CM: J02.9  ICD-9-CM: 462         Otitis externa- keep ear clean and dry, rx ciprodex 4 gtts bid x 7 days, tylenol  Ibuprofen as needed    Pharyngitis- gargle salt water use chloraseptic spray, lozenges, nasal saline, humidiifer  Alternate tylenol.ibupofen as needed    Call if sputum change, fever or worsening sx       Education provided in AVS   No follow-ups on file.

## 2023-03-14 ENCOUNTER — OFFICE VISIT (OUTPATIENT)
Dept: FAMILY MEDICINE CLINIC | Facility: CLINIC | Age: 58
End: 2023-03-14
Payer: COMMERCIAL

## 2023-03-14 DIAGNOSIS — U07.1 COVID: Primary | ICD-10-CM

## 2023-03-14 LAB
EXPIRATION DATE: ABNORMAL
EXPIRATION DATE: NORMAL
FLUAV AG UPPER RESP QL IA.RAPID: NOT DETECTED
FLUBV AG UPPER RESP QL IA.RAPID: NOT DETECTED
INTERNAL CONTROL: ABNORMAL
INTERNAL CONTROL: NORMAL
Lab: ABNORMAL
Lab: NORMAL
S PYO AG THROAT QL: NEGATIVE
SARS-COV-2 AG UPPER RESP QL IA.RAPID: DETECTED

## 2023-03-14 PROCEDURE — 99213 OFFICE O/P EST LOW 20 MIN: CPT | Performed by: NURSE PRACTITIONER

## 2023-03-14 PROCEDURE — 87880 STREP A ASSAY W/OPTIC: CPT | Performed by: NURSE PRACTITIONER

## 2023-03-14 PROCEDURE — 87428 SARSCOV & INF VIR A&B AG IA: CPT | Performed by: NURSE PRACTITIONER

## 2023-03-14 NOTE — PATIENT INSTRUCTIONS
Advised patient to take over the counter cold and flu medication.  May take over the counter ibuprofen as directed.   Return if symptoms worsen or fail to improve.     History/Exam/Medical Decision Making

## 2023-03-14 NOTE — PROGRESS NOTES
Subjective   Graciela Galvan is a 57 y.o. female.     History of Present Illness   Patient presents with c/o fever, body aches and on and off cough X 2 days. Patient denies any shortness of breath. She is not currently taking anything for symptoms.     The following portions of the patient's history were reviewed and updated as appropriate: allergies, current medications, past family history, past medical history, past social history, past surgical history and problem list.    Review of Systems   Constitutional: Positive for fatigue and fever. Negative for appetite change and chills.   HENT: Positive for congestion, postnasal drip and sore throat. Negative for ear pain, rhinorrhea, sinus pressure and sneezing.    Eyes: Negative for redness and itching.   Respiratory: Positive for cough. Negative for chest tightness, shortness of breath and wheezing.    Cardiovascular: Negative for chest pain, palpitations and leg swelling.   Musculoskeletal: Positive for myalgias.   Allergic/Immunologic: Negative.    Neurological: Positive for headache. Negative for dizziness.       Objective   Physical Exam  Constitutional:       Appearance: She is well-developed.   HENT:      Head: Normocephalic and atraumatic.   Eyes:      Pupils: Pupils are equal, round, and reactive to light.   Pulmonary:      Effort: Pulmonary effort is normal.   Neurological:      Mental Status: She is alert and oriented to person, place, and time.   Psychiatric:         Behavior: Behavior normal.         Thought Content: Thought content normal.         Judgment: Judgment normal.         There were no vitals filed for this visit.  There is no height or weight on file to calculate BMI.      Procedures    Assessment & Plan   Problems Addressed this Visit    None  Visit Diagnoses     COVID    -  Primary      Diagnoses       Codes Comments    COVID    -  Primary ICD-10-CM: U07.1  ICD-9-CM: 079.89         POCT sars and flu  Advised patient to take over the  counter cold and flu medication.  May take over the counter ibuprofen as directed.        Return if symptoms worsen or fail to improve.

## 2023-04-05 DIAGNOSIS — I10 BENIGN ESSENTIAL HYPERTENSION: ICD-10-CM

## 2023-04-05 RX ORDER — METOPROLOL SUCCINATE 50 MG/1
TABLET, EXTENDED RELEASE ORAL
Qty: 90 TABLET | Refills: 1 | Status: SHIPPED | OUTPATIENT
Start: 2023-04-05

## 2023-08-16 ENCOUNTER — OFFICE VISIT (OUTPATIENT)
Dept: FAMILY MEDICINE CLINIC | Facility: CLINIC | Age: 58
End: 2023-08-16
Payer: COMMERCIAL

## 2023-08-16 VITALS
SYSTOLIC BLOOD PRESSURE: 122 MMHG | HEIGHT: 66 IN | OXYGEN SATURATION: 98 % | HEART RATE: 68 BPM | WEIGHT: 148.9 LBS | DIASTOLIC BLOOD PRESSURE: 74 MMHG | BODY MASS INDEX: 23.93 KG/M2

## 2023-08-16 DIAGNOSIS — R07.81 RIB PAIN: Primary | ICD-10-CM

## 2023-08-16 PROCEDURE — 99212 OFFICE O/P EST SF 10 MIN: CPT | Performed by: FAMILY MEDICINE

## 2023-08-16 NOTE — PROGRESS NOTES
Assessment and Plan     Patient Instructions    Problem List Items Addressed This Visit    None  Visit Diagnoses       Rib pain    -  Primary    Acute injury getting better.  Reassurance.  Ibuprofen and Tylenol as needed.  [place] may aggravate it but I see no danger going there with this injury               Return if symptoms worsen or fail to improve.              Graciela is a 57 y.o. being seen today for  Abdominal Pain   Subjective   HPI  Was washing her hair in the bathtub and bruised at the right costal area of the ribs. It is getting better.  She is going to [place] next week and is concerned that she could injure herself further.  Social History  She  reports that she quit smoking about 23 years ago. Her smoking use included cigarettes. She has a 30.00 pack-year smoking history. She has been exposed to tobacco smoke. She has never used smokeless tobacco. She reports that she does not currently use alcohol. She reports that she does not use drugs.  Objective   Vital Signs        BP Readings from Last 1 Encounters:   08/16/23 122/74     Wt Readings from Last 3 Encounters:   08/16/23 67.5 kg (148 lb 14.4 oz)   08/22/22 78.9 kg (174 lb)   06/19/22 81.6 kg (180 lb)   Body mass index is 24.03 kg/mý.   Physical Exam  Vitals reviewed.   Constitutional:       Appearance: Normal appearance. She is well-developed.   Chest:       Neurological:      Mental Status: She is alert.   Psychiatric:         Behavior: Behavior normal.         Thought Content: Thought content normal.         Judgment: Judgment normal.     BMI is within normal parameters. No other follow-up for BMI required.               Patient was given instructions and counseling regarding her condition or for health maintenance advice. Please see specific information pulled into the AVS if appropriate.

## 2023-09-22 ENCOUNTER — OFFICE VISIT (OUTPATIENT)
Dept: FAMILY MEDICINE CLINIC | Facility: CLINIC | Age: 58
End: 2023-09-22
Payer: COMMERCIAL

## 2023-09-22 VITALS
HEART RATE: 71 BPM | BODY MASS INDEX: 23.95 KG/M2 | HEIGHT: 66 IN | SYSTOLIC BLOOD PRESSURE: 122 MMHG | WEIGHT: 149 LBS | RESPIRATION RATE: 16 BRPM | DIASTOLIC BLOOD PRESSURE: 68 MMHG | OXYGEN SATURATION: 99 %

## 2023-09-22 DIAGNOSIS — Z00.00 HEALTHCARE MAINTENANCE: Primary | ICD-10-CM

## 2023-09-22 LAB
ALBUMIN SERPL-MCNC: 4.8 G/DL (ref 3.5–5.2)
ALBUMIN/GLOB SERPL: 1.3 G/DL
ALP SERPL-CCNC: 69 U/L (ref 39–117)
ALT SERPL-CCNC: 19 U/L (ref 1–33)
AST SERPL-CCNC: 28 U/L (ref 1–32)
BILIRUB SERPL-MCNC: 0.6 MG/DL (ref 0–1.2)
BUN SERPL-MCNC: 11 MG/DL (ref 6–20)
BUN/CREAT SERPL: 12 (ref 7–25)
CALCIUM SERPL-MCNC: 11.1 MG/DL (ref 8.6–10.5)
CHLORIDE SERPL-SCNC: 105 MMOL/L (ref 98–107)
CHOLEST SERPL-MCNC: 192 MG/DL (ref 0–200)
CO2 SERPL-SCNC: 26 MMOL/L (ref 22–29)
CREAT SERPL-MCNC: 0.92 MG/DL (ref 0.57–1)
EGFRCR SERPLBLD CKD-EPI 2021: 72.8 ML/MIN/1.73
GLOBULIN SER CALC-MCNC: 3.7 GM/DL
GLUCOSE SERPL-MCNC: 94 MG/DL (ref 65–99)
HDLC SERPL-MCNC: 65 MG/DL (ref 40–60)
LDLC SERPL CALC-MCNC: 109 MG/DL (ref 0–100)
LDLC/HDLC SERPL: 1.64 {RATIO}
POTASSIUM SERPL-SCNC: 4.3 MMOL/L (ref 3.5–5.2)
PROT SERPL-MCNC: 8.5 G/DL (ref 6–8.5)
SODIUM SERPL-SCNC: 141 MMOL/L (ref 136–145)
TRIGL SERPL-MCNC: 103 MG/DL (ref 0–150)
TSH SERPL DL<=0.005 MIU/L-ACNC: 3.85 UIU/ML (ref 0.27–4.2)
VLDLC SERPL CALC-MCNC: 18 MG/DL (ref 5–40)

## 2023-09-22 NOTE — PROGRESS NOTES
Assessment and Plan     Patient Instructions    Problem List Items Addressed This Visit    None  Visit Diagnoses       Healthcare maintenance    -  Primary    Relevant Orders    CBC (No Diff)    Comprehensive Metabolic Panel    Lipid Panel With LDL / HDL Ratio    TSH               Return in about 1 year (around 9/22/2024).          Graciela is a 57 y.o. being seen today for  Annual Exam   Subjective   HPI  Annual Exam-Postmenopausal:     Graciela Galvan 57 y.o.female presents for annual exam.     Last pap : approximate date 2020 and was normal  Last mammogramapproximate date 5/2022 and was normal  The patient is not taking hormone replacement therapy.   The patient wears seatbelts: yes.    The patient has regular exercise: no.     But running with her grandkids  This patient has ever been tested for HepC : yes .   Dental Exam. up to date  history; colonoscopy/sigmoidoscopy: colonoguard 3 years ago   Her immunization are up-to-date.  She is eating a healthy diet.   Labs results were ordered  Social History  She  reports that she quit smoking about 23 years ago. Her smoking use included cigarettes. She has a 30.00 pack-year smoking history. She has been exposed to tobacco smoke. She has never used smokeless tobacco. She reports that she does not currently use alcohol. She reports that she does not use drugs.  Objective   Vital Signs  BP Readings from Last 1 Encounters:   09/22/23 122/68     Wt Readings from Last 3 Encounters:   09/22/23 67.6 kg (149 lb)   08/16/23 67.5 kg (148 lb 14.4 oz)   08/22/22 78.9 kg (174 lb)   Body mass index is 24.05 kg/m².   Physical Exam  Vitals reviewed.   Constitutional:       General: She is not in acute distress.     Appearance: Normal appearance. She is well-developed.   HENT:      Head: Normocephalic and atraumatic.      Right Ear: Tympanic membrane, ear canal and external ear normal.      Left Ear: Tympanic membrane, ear canal and external ear normal.   Eyes:      Conjunctiva/sclera:  Conjunctivae normal.   Neck:      Thyroid: No thyromegaly.      Trachea: No tracheal deviation.   Cardiovascular:      Rate and Rhythm: Normal rate and regular rhythm.      Heart sounds: Normal heart sounds.   Pulmonary:      Effort: Pulmonary effort is normal. No respiratory distress.      Breath sounds: Normal breath sounds. No wheezing or rales.   Chest:   Breasts:     Breasts are symmetrical.      Right: No mass.      Left: No mass.   Abdominal:      General: Bowel sounds are normal. There is no distension.      Palpations: Abdomen is soft.      Tenderness: There is no abdominal tenderness.   Musculoskeletal:         General: No deformity.      Cervical back: Normal range of motion and neck supple.      Right lower leg: No edema.      Left lower leg: No edema.   Lymphadenopathy:      Cervical: No cervical adenopathy.   Skin:     General: Skin is warm and dry.   Neurological:      Mental Status: She is alert and oriented to person, place, and time.   Psychiatric:         Behavior: Behavior normal.         Thought Content: Thought content normal.         Judgment: Judgment normal.     BMI is within normal parameters. No other follow-up for BMI required.               Patient was given instructions and counseling regarding her condition or for health maintenance advice. Please see specific information pulled into the AVS if appropriate.

## 2023-09-23 LAB
ERYTHROCYTE [DISTWIDTH] IN BLOOD BY AUTOMATED COUNT: 12.8 % (ref 12.3–15.4)
HCT VFR BLD AUTO: 40.5 % (ref 34–46.6)
HGB BLD-MCNC: 13.7 G/DL (ref 12–15.9)
MCH RBC QN AUTO: 30.1 PG (ref 26.6–33)
MCHC RBC AUTO-ENTMCNC: 33.8 G/DL (ref 31.5–35.7)
MCV RBC AUTO: 89 FL (ref 79–97)
PLATELET # BLD AUTO: 221 10*3/MM3 (ref 140–450)
RBC # BLD AUTO: 4.55 10*6/MM3 (ref 3.77–5.28)
WBC # BLD AUTO: 4.56 10*3/MM3 (ref 3.4–10.8)

## 2023-09-26 DIAGNOSIS — I10 BENIGN ESSENTIAL HYPERTENSION: ICD-10-CM

## 2023-09-26 RX ORDER — METOPROLOL SUCCINATE 50 MG/1
TABLET, EXTENDED RELEASE ORAL
Qty: 90 TABLET | Refills: 1 | Status: SHIPPED | OUTPATIENT
Start: 2023-09-26

## 2023-10-11 DIAGNOSIS — I10 BENIGN ESSENTIAL HYPERTENSION: ICD-10-CM

## 2023-10-11 RX ORDER — METOPROLOL SUCCINATE 50 MG/1
TABLET, EXTENDED RELEASE ORAL
Qty: 90 TABLET | Refills: 1 | Status: SHIPPED | OUTPATIENT
Start: 2023-10-11

## 2023-12-05 ENCOUNTER — OFFICE VISIT (OUTPATIENT)
Dept: FAMILY MEDICINE CLINIC | Facility: CLINIC | Age: 58
End: 2023-12-05
Payer: COMMERCIAL

## 2023-12-05 VITALS
OXYGEN SATURATION: 98 % | BODY MASS INDEX: 23.46 KG/M2 | RESPIRATION RATE: 18 BRPM | DIASTOLIC BLOOD PRESSURE: 80 MMHG | HEIGHT: 66 IN | WEIGHT: 146 LBS | SYSTOLIC BLOOD PRESSURE: 120 MMHG | TEMPERATURE: 98 F | HEART RATE: 73 BPM

## 2023-12-05 DIAGNOSIS — J06.9 VIRAL UPPER RESPIRATORY TRACT INFECTION: Primary | ICD-10-CM

## 2023-12-05 LAB
EXPIRATION DATE: NORMAL
FLUAV AG UPPER RESP QL IA.RAPID: NOT DETECTED
FLUBV AG UPPER RESP QL IA.RAPID: NOT DETECTED
INTERNAL CONTROL: NORMAL
Lab: NORMAL
SARS-COV-2 AG UPPER RESP QL IA.RAPID: NOT DETECTED

## 2023-12-05 PROCEDURE — 99213 OFFICE O/P EST LOW 20 MIN: CPT | Performed by: NURSE PRACTITIONER

## 2023-12-05 PROCEDURE — 87428 SARSCOV & INF VIR A&B AG IA: CPT | Performed by: NURSE PRACTITIONER

## 2023-12-05 RX ORDER — VORTIOXETINE 10 MG/1
TABLET, FILM COATED ORAL
COMMUNITY
Start: 2023-11-06

## 2023-12-05 RX ORDER — CETIRIZINE HYDROCHLORIDE 10 MG/1
10 TABLET ORAL DAILY
Qty: 30 TABLET | Refills: 0 | Status: SHIPPED | OUTPATIENT
Start: 2023-12-05 | End: 2024-01-04

## 2023-12-05 RX ORDER — AZELASTINE 1 MG/ML
2 SPRAY, METERED NASAL 2 TIMES DAILY
Qty: 30 ML | Refills: 1 | Status: SHIPPED | OUTPATIENT
Start: 2023-12-05 | End: 2024-01-04

## 2023-12-05 NOTE — PROGRESS NOTES
Subjective   Graciela Galvan is a 58 y.o. female.     Cough    Dr. Sesay patient, new to this provider, acute visit for cough    Acute cough, congestion x 3 days . No fever. Tried afrin, alkaseltzer plus cold OTC meds, this helps her sleep.  She is worried this will develop into sinus infection.  Denies headache, facial pressure today, afebrile.    The following portions of the patient's history were reviewed and updated as appropriate: allergies, current medications, past family history, past medical history, past social history, past surgical history and problem list.    Review of Systems   Respiratory:  Positive for cough.      Objective   Physical Exam  Vitals reviewed.   Constitutional:       Appearance: Normal appearance.   HENT:      Head: Normocephalic.      Right Ear: Tympanic membrane normal.      Left Ear: Tympanic membrane normal.      Nose: Nose normal. Congestion present.      Mouth/Throat:      Mouth: Mucous membranes are moist.      Pharynx: Posterior oropharyngeal erythema present.   Eyes:      General:         Right eye: No discharge.         Left eye: No discharge.      Pupils: Pupils are equal, round, and reactive to light.   Cardiovascular:      Rate and Rhythm: Normal rate and regular rhythm.      Pulses: Normal pulses.      Heart sounds: Normal heart sounds.   Pulmonary:      Effort: Pulmonary effort is normal.      Breath sounds: Normal breath sounds. No wheezing or rhonchi.   Abdominal:      General: Bowel sounds are normal.      Palpations: Abdomen is soft.   Musculoskeletal:      Cervical back: Normal range of motion.   Lymphadenopathy:      Cervical: No cervical adenopathy.   Skin:     Findings: No rash.   Neurological:      General: No focal deficit present.      Mental Status: She is alert.         Vitals:    12/05/23 1558   BP: 120/80   Pulse: 73   Resp: 18   Temp: 98 °F (36.7 °C)   SpO2: 98%     Body mass index is 23.57 kg/m².    Procedures    Assessment & Plan   Problems  Addressed this Visit    None  Diagnoses    None.       Orders Placed This Encounter   Procedures    POCT SARS-CoV-2 Antigen NILA + Flu     Order Specific Question:   Release to patient     Answer:   Routine Release [[id number]]     Negative testing    Upper respiratory infection-reviewed negative tests, no need for antibiotic or steroid at this time, continue supportive care with over-the-counter cold medication, fluids, rest.  Rx cetirizine 10 mg a day, Rx Astelin nasal spray 2 sprays twice daily, advised patient to use over-the-counter nasal decongestant sparingly.  Advised if she uses Afrin nasal spray to limit use to 3 days.  Can also try coolmist humidifier, nasal saline spray  , Fever or worsening symptoms       Education provided in AVS   No follow-ups on file.

## 2024-02-28 ENCOUNTER — TELEPHONE (OUTPATIENT)
Dept: FAMILY MEDICINE CLINIC | Facility: CLINIC | Age: 59
End: 2024-02-28
Payer: COMMERCIAL

## 2024-02-28 RX ORDER — SUMATRIPTAN 100 MG/1
100 TABLET, FILM COATED ORAL
Qty: 12 TABLET | Refills: 2 | Status: SHIPPED | OUTPATIENT
Start: 2024-02-28

## 2024-02-28 NOTE — TELEPHONE ENCOUNTER
Caller: Graciela Galvan    Relationship: Self    Best call back number: [phone]*    What medication are you requesting: SUMATRIPTAN (IMITREX) 100 MG (NOT ON MED LIST)    What are your current symptoms: MIGRAINES    Have you had these symptoms before:    [x] Yes  [] No    Have you been treated for these symptoms before:   [x] Yes  [] No    If a prescription is needed, what is your preferred pharmacy and phone number: [facility] [id number] - [facility] [address]  AT [facility] [address] [facility] [phone] [facility] [phone] FX     Additional notes: PATIENT STATES THAT SHE IS OUT OF THIS MEDICATION AND HAS BEEN A LONG TIME SINCE LAST REFILL.

## 2024-02-28 NOTE — TELEPHONE ENCOUNTER
Refill Ok'd by Dr. Sesay, patient's PCP. Patient has been informed that refill has been sent to preferred pharmacy.

## 2024-05-15 DIAGNOSIS — I10 BENIGN ESSENTIAL HYPERTENSION: ICD-10-CM

## 2024-05-16 RX ORDER — METOPROLOL SUCCINATE 50 MG/1
50 TABLET, EXTENDED RELEASE ORAL DAILY
Qty: 90 TABLET | Refills: 1 | Status: SHIPPED | OUTPATIENT
Start: 2024-05-16

## 2024-05-28 ENCOUNTER — TELEPHONE (OUTPATIENT)
Dept: FAMILY MEDICINE CLINIC | Facility: CLINIC | Age: 59
End: 2024-05-28
Payer: COMMERCIAL

## 2024-05-28 NOTE — TELEPHONE ENCOUNTER
Spoke with patient. Scheduled patient for initial evaluation with JORDYN Jackson. Patient understands that Ryan only provides medication management. Patient will come to office to fill out paperwork this week before her initial evaluation.

## 2024-06-04 ENCOUNTER — OFFICE VISIT (OUTPATIENT)
Dept: BEHAVIORAL HEALTH | Facility: CLINIC | Age: 59
End: 2024-06-04

## 2024-06-04 VITALS
BODY MASS INDEX: 24.7 KG/M2 | OXYGEN SATURATION: 97 % | HEART RATE: 69 BPM | RESPIRATION RATE: 16 BRPM | SYSTOLIC BLOOD PRESSURE: 100 MMHG | WEIGHT: 153.7 LBS | HEIGHT: 66 IN | DIASTOLIC BLOOD PRESSURE: 64 MMHG

## 2024-06-04 DIAGNOSIS — F32.9 TREATMENT-RESISTANT DEPRESSION: Primary | ICD-10-CM

## 2024-06-04 DIAGNOSIS — F10.11 ALCOHOL ABUSE, IN REMISSION: ICD-10-CM

## 2024-06-04 DIAGNOSIS — F41.1 GAD (GENERALIZED ANXIETY DISORDER): ICD-10-CM

## 2024-06-04 RX ORDER — VILAZODONE HYDROCHLORIDE 20 MG/1
TABLET ORAL
Qty: 70 TABLET | Refills: 0 | Status: SHIPPED | OUTPATIENT
Start: 2024-06-04 | End: 2024-07-16

## 2024-06-04 NOTE — PATIENT INSTRUCTIONS
Medication changes:   Zoloft, go down to 1 tablet X 14 days then stop medication  At the same time okay to start Viibryd/vilazodone 20 mg X 14 days then double dose and continue until follow-up appointment  Please read attached handout on medications.Patient educated they must be on psychiatric medications consistently for at least 4 to 6 weeks to get maximum benefit.    Therapy recommendation:   Continue counseling/therapy with established therapist     GENERAL NEW PATIENT INSTRUCTIONS:    -Please arrive in person or virtually 10-15 minutes prior to appointment to allow for registration/sign in/questionnaires. If you are seen virtually please review after visit summary (AVS)/patient instructions via [facility] for a summary of plan of care/changes in treatment plan. If you are having difficulties logging on or accessing [facility] please contact my office for assistance.    -The best way to get a hold of Gene is to call the office at [phone] and ask to leave a message with his medical assistant. Gene Maldonado is a Psychiatric Mental Health Nurse Practitioner, due to his specialty patient's are not able to message him directly via [facility]. Please give my office up to 48 hours to respond to a patient call/question/refill request. Refill requests will be made during normal office hours only, Monday-Friday 8:00-5:30.      -Gene is out of the office on Fridays and weekends. Tuesdays and Thursdays are Gene's in-office days, Mondays and Wednesdays are his telehealth days.  The decision to be seen virtually or in person is up to the discretion of the provider, not all behavioral health problems are appropriate for telehealth.    -Please call the office at [phone] with any worsening of symptoms or onset of intolerable side effects.  -Follow-up appointments must be maintained in order for prescribing to continue.  -Patient has been educated regarding multimodal approach with healthy nutrition, healthy sleep, regular  physical activity, social activities, counseling, and medications.   -Please call 911 or go to the nearest ER if you begin to have thoughts of harming yourself or other people.    No show policy:  We understand unexpected circumstances arise; however, anytime you miss your appointment we are unable to provide you appropriate care.  In addition, each appointment missed could have been used to provide care for others.  We ask that you call at least 24 hours in advance to cancel or reschedule an appointment. We would like to take this opportunity to remind you of our policy stating patients who miss THREE or more appointments without cancelling or rescheduling 24 hours in advance of the appointment may be subject to cancellation of any further visits with our clinic and recommendation to seek in-person services/visits. Please call [phone] to reschedule your appointment. If there are reasons that make it difficult for you to keep the appointments, please call and let us know how we can help. Please understand that medication prescribing will not continue without seeing your provider.

## 2024-06-04 NOTE — PROGRESS NOTES
"  Subjective    PATIENT ID: Graciela Galvan, :1965, MRN: [id number]    Chief Complaint   Patient presents with    Anxiety    Depression     HPI:   58 y.o. patient pt presents to [facility] 2024 at the request of SELF REFERRAL. Psychiatric history listed below, patient presents today with S/S of Depression and Anxiety.  Patient has been seeing an eye doctor Sharri at [facility], [place], operated by [facility] up until a few weeks ago reports she is only in her office 1 day a week and is not very accessible, recent med changes made by this provider including increasing Zoloft.  I have no previous records to review will request.     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  Patient reports depression over the years of slowly gotten worse, reports she does not feel hopeless this time but states things are just too much, patient does report SI often reports thoughts of walking into traffic states these are fleeting, support people identified.    Patient was born and raised in [place], is  but has significant other Bry Morillo who she lives with, patient has an adult son who lives in [place], patient has history of alcoholism reports 2 years sober, history of self-harm, patient reports she hit herself due to agitation a few weeks ago, history of IOP at the [facility] in .    -Psych meds pt currently on: Zoloft X 2 months was increased to 150 mg 3 weeks ago, medication has not been helpful and has resulted in significant diarrhea  -Sleep Problems: \"Terrible, I have trouble falling and staying asleep, trazodone helps but makes me groggy the next morning\" per pt  -PHQ: (P) 18  -MOISES: (P) " "8    Patient Active Problem List   Diagnosis    Acne rosacea    Alcohol abuse    Depression    Subclinical hypothyroidism    Migraine headache    Gastroesophageal reflux disease     PSYCHIATRIC HX:    -Previous psych medications: Cymbalta, made contacts popout, dry mouth, always thirsty, Prozac, Effexor, Lexapro, Celexa, Wellbutrin/Trintellix/Rexulti combination not effective, Trintellix/Rexulti combination not effective  -Previous diagnoses:Depression, Anxiety, PTSD, Self Harm, alcohol abuse  -Previous therapy/treatment: Medications, One-on-one counseling, IOP X 2 weeks at the [facility], AA/NA meetings  -Previous psychiatric hospitalizations: Denies  -Previous suicide attempts/self harm:  Sticking pins and face, hitting myself, last hit herself 2 weeks ago  -History of trauma/abuse: Sexual: Denies, Emotional: Childhood trauma, Physical: Childhood trauma, Traumatic Event: Childhood  -Family psychiatry history: Mother, depression and alcohol abuse    SOCIAL/SEXUAL/SUBSTANCE HX:    -Narrative: Pt was born/raised in [place]. Pt describes childhood as traumatic.  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, hobbies include making glass beads infusing last ornaments, Highest level of education: Bachelors  -Other:Pets: 115 pound cat named Anupam, Bahai: Atheist/agnostic , Exercise: \"Not as much as I should\"    -Alcohol: Never  -Nicotine: Never  -Caffeine: Never  -THC/CBD: Denies  -Street Drugs: Denies  -History of ETOH/Substance Abuse:  2 years sober from alcohol, goes to 3-4 AA meetings a week, home group is called [facility] on Saturday mornings    Family History "   Problem Relation Age of Onset    Heart disease Mother     Stroke Mother     Alcohol abuse Mother     Heart attack Father     Diabetes Father     Hypertension Father       PAST MEDICAL HISTORY:  Past Medical History:   Diagnosis Date    Alcohol abuse     I am almost 2 years sober    Alcoholism 2009    I am almost 2 years sober    Anxiety     Depression     Fatigue     Hypertension     IBS (irritable bowel syndrome)     Migraine headache     Panic disorder 1970    Pneumonia     PTSD (post-traumatic stress disorder) 1971    Recently found out I qualifiy for this    Rosacea     Self-injurious behavior 1980    Substance abuse      Past Medical History Pertinent Negatives:   Diagnosis Date Noted    ADHD (attention deficit hyperactivity disorder) 2024    Anorexia nervosa 2024    Autism spectrum disorder 2024    Bipolar disorder 2024    Borderline personality disorder 2024    Bulimia nervosa 2024    Cancer 2024    Chronic pain disorder 2024    Disease of thyroid gland 2024    Head injury 2024    HIV disease 2024    Liver disease 2024    Obsessive-compulsive disorder 2024    Oppositional defiant disorder 2024    Peripheral neuropathy 2024    Psychiatric illness 2024    Psychosis 2024    Schizoaffective disorder 2024    Seizures 2024    Suicide attempt 2024    Violence, history of 2024    Withdrawal symptoms, alcohol 2024    Withdrawal symptoms, drug or narcotic 2024       Past Surgical History:   Procedure Laterality Date    ABDOMINAL SURGERY       SECTION      CHOLECYSTECTOMY      COLONOSCOPY      TOOTH EXTRACTION          Review of Systems   Constitutional:  Positive for fatigue. Negative for chills and fever.   Respiratory:  Positive for chest tightness. Negative for shortness of breath.    Cardiovascular:  Negative for chest pain.   Gastrointestinal:  Positive for  "diarrhea. Negative for nausea and vomiting.   Neurological:  Negative for dizziness and light-headedness.   Psychiatric/Behavioral:  Positive for decreased concentration, sleep disturbance and depressed mood. Negative for suicidal ideas. The patient is nervous/anxious.            Objective   Visit Vitals  /64   Pulse 69   Resp 16   Ht 167.6 cm (66\")   Wt 69.7 kg (153 lb 11.2 oz)   LMP  (LMP Unknown)   SpO2 97%   Breastfeeding No   BMI 24.81 kg/m²       Wt Readings from Last 3 Encounters:   06/04/24 69.7 kg (153 lb 11.2 oz)   12/05/23 66.2 kg (146 lb)   09/22/23 67.6 kg (149 lb)     BP Readings from Last 3 Encounters:   06/04/24 100/64   12/05/23 120/80   09/22/23 122/68     Pulse Readings from Last 3 Encounters:   06/04/24 69   12/05/23 73   09/22/23 71        PHYSICAL EXAM:  Constitutional:       Appearance: Normal appearance.   HENT:      Head: Normocephalic.   Pulmonary:      Effort: Pulmonary effort is normal.   Skin:     General: Skin is dry.   Neurological:      Mental Status: The patient is alert and oriented to person, place, and time.      MENTAL STATUS EXAM   General Appearance:  Cleanly groomed and dressed  Eye Contact:  Good eye contact  Attitude:  Cooperative  Motor Activity:  Normal gait, posture  Speech:  Normal rate, tone, volume  Language:  Spontaneous  Mood and affect:  Normal, pleasant  Thought Process:  Logical  Associations/ Thought Content:  No delusions  Hallucinations:  None  Suicidal Ideations:  Specific thoughts but denies intent (Patient reports fleeting thoughts of walking into traffic, reports it is not all the time and are fleeting)  Homicidal Ideation:  Not present  Sensorium:  Alert  Orientation:  Person, place, time and situation  Attention Span/ Concentration:  Good  Fund of Knowledge:  Appropriate for age and educational level  Intellectual Functioning:  Average range  Insight:  Fair  Judgement:  Fair  Reliability:  Fair    PHQ-9 Depression Screening  Little interest or " pleasure in doing things? (P) 3-->nearly every day   Feeling down, depressed, or hopeless? (P) 2-->more than half the days   Trouble falling or staying asleep, or sleeping too much? (P) 3-->nearly every day   Feeling tired or having little energy?     Poor appetite or overeating? (P) 3-->nearly every day   Feeling bad about yourself/you are a failure/have let yourself or your family down? (P) 1-->several days   Trouble concentrating on things? (P) 3-->nearly every day   Psychomotor agitation/retardation (P) 0-->not at all   Thoughts about death/dying/suicide (P) 0-->not at all   PHQ-9 Total Score (P) 18   How difficult have these problems for you? (P) very difficult     GAD7 Documentation:  Feeling nervous, anxious or on edge (P) 1   Not being able to stop or control worrying (P) 3   Worrying too much about different things (P) 2   Trouble relaxing (P) 1   Being so restless that it is hard to sit still (P) 0   Becoming easily annoyed or irritable (P) 1   Feeling Afraid as if something awful might happen (P) 0   MOISES Total Score (P) 8   How difficult have these problems made it for you? (P) Very difficult     LABS:  Lab Results   Component Value Date    GLUCOSE 94 09/22/2023    BUN 11 09/22/2023    CREATININE 0.92 09/22/2023    EGFRRESULT 72.8 09/22/2023    BCR 12.0 09/22/2023    K 4.3 09/22/2023    CO2 26.0 09/22/2023    CALCIUM 11.1 (H) 09/22/2023    PROTENTOTREF 8.5 09/22/2023    ALBUMIN 4.8 09/22/2023    BILITOT 0.6 09/22/2023    AST 28 09/22/2023    ALT 19 09/22/2023     CBC          9/22/2023    09:46   CBC   WBC 4.56    RBC 4.55    Hemoglobin 13.7    Hematocrit 40.5    MCV 89.0    MCH 30.1    MCHC 33.8    RDW 12.8    Platelets 221      Lipid Panel          9/22/2023    09:46   Lipid Panel   Total Cholesterol 192    Triglycerides 103    HDL Cholesterol 65    VLDL Cholesterol 18    LDL Cholesterol  109    LDL/HDL Ratio 1.64      TSH          9/22/2023    09:46   TSH   TSH 3.850        Allergies   Allergen  Reactions    Erythromycin Anaphylaxis    Butorphanol        Current Outpatient Medications   Medication Instructions    metoprolol succinate XL (TOPROL-XL) 50 mg, Oral, Daily    SUMAtriptan (IMITREX) 100 mg, Oral, Every 2 Hours PRN    traZODone (DESYREL) 100 MG tablet Take 1 tablet  as needed for sleep.    vilazodone (VIIBRYD) 20 MG tablet tablet Take 1 tablet by mouth Daily for 14 days, THEN 2 tablets Daily for 28 days.     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    AVS INSTRUCTIONS:    Medication changes:   Zoloft, go down to 1 tablet X 14 days then stop medication  At the same time okay to start Viibryd/vilazodone 20 mg X 14 days then double dose and continue until follow-up appointment  Please read attached handout on medications.Patient educated they must be on psychiatric medications consistently for at least 4 to 6 weeks to get maximum benefit.    Therapy recommendation:   Continue counseling/therapy with established therapist , patient seeing new therapist the first time today    -Consider Lamictal in the future, consider alternative treatments due to patient failing multiple psych meds, consider Spravato, patient has had genetic testing, I am unable to see those results, will request those and records from Dr. Velarde office.  Patient educated that history of prior meds would potentially point to a misdiagnosis like bipolar or borderline, patient does have history of self-harm, addiction, childhood trauma, patient reports seeing a new therapist today due to prior therapist not gelling    MEDICATION ISSUES:  ADAMA: Reviewed 06/04/2024      Medications Discontinued During This Encounter   Medication Reason     cetirizine (zyrTEC) 10 MG tablet *Therapy completed    Trintellix 10 MG tablet tablet *Therapy completed     New Medications Ordered This Visit   Medications    vilazodone (VIIBRYD) 20 MG tablet tablet     Sig: Take 1 tablet by mouth Daily for 14 days, THEN 2 tablets Daily for 28 days.     Dispense:  70 tablet     Refill:  0      No orders of the defined types were placed in this encounter.    -Barriers: history of multiple failed medications due to lack of efficacy and/or side effects   -Strengths:  strong support system and pets    -Short-Term Goals: Pt will be compliant with medication management and note improvement in S/S over the next 4 to 6 weeks or at next scheduled visit.  -Long-Term Goals: Pt will be compliant with the agreed treatment plan including medication regimen & F/U appt's and deny impairment in daily functioning over the next 6 months.      -Progress toward goal: Not at goal  -Functional Status: Moderate impairment   -Prognosis: Fair with Ongoing Treatment        SUMMARY/EDUCATION/DISCUSSION:  -Pt was given appropriate time to ask questions and concerns were addressed. A thorough discussion was had that included review of disease process, need for continued monitoring and additional treatment options including use of pharmacological and non-pharmacological approaches to care, decisions were made and agreed upon by patient and provider.   -Discussed the risks, benefits, and potential side effects of the medications; patient ackowledged and verbally consented.   -Please call the office at [phone] with any worsening of symptoms or onset of intolerable side effects, please ask to leave a message with Gene's medical assistant.  Please give my office up to 48 hours to respond to a patient call/question/refill request.  -Patient is agreeable to call 911 or go to the nearest ER should he/she/they have any thoughts of harm to self or others.  -Patient has been educated regarding multimodal  approach with healthy nutrition, healthy sleep, regular physical activity, social activities, counseling, and medications.     Part of this note may be an electronic transcription/translation of spoken language to printed text using the Dragon Dictation System. Some of the data in this electronic note has been brought forward from a previous encounter, any necessary changes have been made, it has been reviewed by this APRN, and it is accurate.    This document has been electronically signed by JORDYN Jackson June 4, 2024 17:39 EDT

## 2024-06-20 ENCOUNTER — TELEPHONE (OUTPATIENT)
Dept: BEHAVIORAL HEALTH | Facility: CLINIC | Age: 59
End: 2024-06-20

## 2024-06-20 NOTE — TELEPHONE ENCOUNTER
Patient called office with medication follow-up. She was very emotional, in tears when on the phone explaining that she feels like she cannot stop crying. The patient explained that she was coming off her sertraline prescription and, at the same time, starting her vilazodone prescription. Since this most recent Tuesday, the patient is completely off of her sertraline and on two vilazodone daily (40mg/daily total). She denied any recent traumatic events that have caused these crying spells. Patient denies any suicidal thoughts or thoughts of self-harm. Patient is asking for medication advice.    Patient advised to present to emergency room if she feels like she is a harm to herself or others. Patient verbalized an understanding.   68

## 2024-06-21 NOTE — TELEPHONE ENCOUNTER
Spoke with patient, advised patient to continue taking sertraline 50mg daily for one additional week as well as her 40mg vilazodone (2 pills) daily. Patient advised to call in one week to report how she is doing.

## 2024-07-13 DIAGNOSIS — F41.1 GAD (GENERALIZED ANXIETY DISORDER): ICD-10-CM

## 2024-07-13 DIAGNOSIS — F32.9 TREATMENT-RESISTANT DEPRESSION: ICD-10-CM

## 2024-07-15 RX ORDER — VILAZODONE HYDROCHLORIDE 40 MG/1
40 TABLET ORAL
Qty: 30 TABLET | Refills: 0 | Status: SHIPPED | OUTPATIENT
Start: 2024-07-15 | End: 2024-07-16 | Stop reason: SINTOL

## 2024-07-16 ENCOUNTER — OFFICE VISIT (OUTPATIENT)
Dept: BEHAVIORAL HEALTH | Facility: CLINIC | Age: 59
End: 2024-07-16
Payer: MEDICAID

## 2024-07-16 VITALS
DIASTOLIC BLOOD PRESSURE: 66 MMHG | SYSTOLIC BLOOD PRESSURE: 104 MMHG | WEIGHT: 163 LBS | OXYGEN SATURATION: 98 % | HEIGHT: 66 IN | BODY MASS INDEX: 26.2 KG/M2 | HEART RATE: 77 BPM

## 2024-07-16 DIAGNOSIS — F32.9 TREATMENT-RESISTANT DEPRESSION: Primary | ICD-10-CM

## 2024-07-16 NOTE — PROGRESS NOTES
Subjective      Chief Complaint   Patient presents with    Depression     HPI:  Graciela Galvan, 58 y.o. pt presents to [facility] on 07/16/2024 for F/U, pt seen today for psychiatric med management of Depression.  Patient seen last roughly 6 weeks prior at that time she was instructed to transition off Zoloft and start newer agent Viibryd for treatment resistant depression, since then patient reports she was not able to tolerate new medication, reports it made her cry all the time, was on it for around 2 weeks and then stopped, in addition patient reports she is having menopausal like symptoms is having night sweats and has trouble sleeping, patient reports she had an epiphany like episode states since being off all psych meds she feels somewhat stable and does not want to pursue medication at this time, has a new therapist that she loves is starting to go to a weekly women's trauma group reports its around $60 but it has been very helpful new therapist name is Kierra, GI related symptoms have resolved, different treatment options discussed including Spravato, TMS, ECT, patient would like to hold off for the time being.  Would like to follow-up with myself in 4 months which I agree to.    PSYCHIATRIC HX:    -Previous psych medications: Cymbalta, made contacts popout, dry mouth, always thirsty, Prozac, Effexor, Lexapro, Celexa, Wellbutrin/Trintellix/Rexulti combination not effective, Trintellix/Rexulti combination not effective  -Previous diagnoses:Depression, Anxiety, PTSD, Self Harm, alcohol abuse  -Previous therapy/treatment: Medications, One-on-one counseling, IOP X 2 weeks at the [facility], AA/NA meetings  -Previous psychiatric hospitalizations: Denies  -Previous suicide attempts/self harm:  Sticking pins and face, hitting myself, last hit herself 2 weeks ago  -History of trauma/abuse: Sexual: Denies, Emotional: Childhood trauma, Physical: Childhood trauma, Traumatic Event: Childhood  -Family  "psychiatry history: Mother, depression and alcohol abuse    SOCIAL/SEXUAL/SUBSTANCE HX:    -Narrative: Pt was born/raised in [place]. Pt describes childhood as traumatic.  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, hobbies include making glass beads infusing last ornaments, Highest level of education: Bachelors  -Other:Pets: 115 pound cat named Anupam, Baptist: Atheist/agnostic , Exercise: \"Not as much as I should\"    -Alcohol: Never  -Nicotine: Never  -Caffeine: Never  -THC/CBD: Denies  -Street Drugs: Denies  -History of ETOH/Substance Abuse:  2 years sober from alcohol, goes to 3-4 AA meetings a week, home group is called [facility] on Saturday mornings    Past Medical History:   Diagnosis Date    Alcohol abuse 2009    I am almost 2 years sober    Alcoholism 2009    I am almost 2 years sober    Anxiety     Depression     Fatigue     Hypertension     IBS (irritable bowel syndrome)     Migraine headache     Panic disorder 1970    Pneumonia     PTSD (post-traumatic stress disorder) 1971    Recently found out I qualifiy for this    Rosacea     Self-injurious behavior 1980    Substance abuse      Past Medical History Pertinent Negatives:   Diagnosis Date Noted    ADHD (attention deficit hyperactivity disorder) 06/04/2024    Anorexia nervosa 06/04/2024    Autism spectrum disorder 06/04/2024    Bipolar disorder 06/04/2024    Borderline personality disorder 06/04/2024    Bulimia nervosa 06/04/2024    Cancer 06/04/2024    Chronic pain disorder 06/04/2024    Disease of thyroid gland 06/04/2024    Head injury 06/04/2024    HIV disease 06/04/2024    Liver disease " "06/04/2024    Obsessive-compulsive disorder 06/04/2024    Oppositional defiant disorder 06/04/2024    Peripheral neuropathy 06/04/2024    Psychiatric illness 06/04/2024    Psychosis 06/04/2024    Schizoaffective disorder 06/04/2024    Seizures 06/04/2024    Suicide attempt 06/04/2024    Violence, history of 06/04/2024    Withdrawal symptoms, alcohol 06/04/2024    Withdrawal symptoms, drug or narcotic 06/04/2024     Review of Systems   Constitutional:  Positive for diaphoresis. Negative for chills and fever.   Respiratory:  Negative for chest tightness and shortness of breath.    Cardiovascular:  Negative for chest pain.   Gastrointestinal:  Negative for nausea and vomiting.   Neurological:  Negative for dizziness and light-headedness.   Psychiatric/Behavioral:  Positive for agitation, sleep disturbance and depressed mood. Negative for suicidal ideas.            Objective   Visit Vitals  /66   Pulse 77   Ht 167.6 cm (66\")   Wt 73.9 kg (163 lb)   LMP  (LMP Unknown)   SpO2 98%   BMI 26.31 kg/m²     Wt Readings from Last 3 Encounters:   07/16/24 73.9 kg (163 lb)   06/04/24 69.7 kg (153 lb 11.2 oz)   12/05/23 66.2 kg (146 lb)     BP Readings from Last 3 Encounters:   07/16/24 104/66   06/04/24 100/64   12/05/23 120/80     Pulse Readings from Last 3 Encounters:   07/16/24 77   06/04/24 69   12/05/23 73      PHYSICAL EXAM:  Constitutional:       Appearance: Normal appearance.   HENT:      Head: Normocephalic.   Pulmonary:      Effort: Pulmonary effort is normal.   Skin:     General: Skin is dry.   Neurological:      Mental Status: The patient is alert and oriented to person, place, and time.      MENTAL STATUS EXAM   General Appearance:  Cleanly groomed and dressed  Eye Contact:  Good eye contact  Attitude:  Cooperative  Speech:  Normal rate, tone, volume  Language:  Spontaneous  Mood and affect:  Normal, pleasant  Thought Process:  Logical  Associations/ Thought Content:  No delusions  Hallucinations:  " None  Suicidal Ideations:  Not present  Sensorium:  Alert  Orientation:  Person, place, time and situation  Attention Span/ Concentration:  Good  Fund of Knowledge:  Appropriate for age and educational level  Intellectual Functioning:  Average range  Insight:  Fair  Judgement:  Fair  Reliability:  Fair    PHQ-9 Depression Screening  Little interest or pleasure in doing things? (P) 3-->nearly every day   Feeling down, depressed, or hopeless? (P) 3-->nearly every day   Trouble falling or staying asleep, or sleeping too much? (P) 3-->nearly every day   Feeling tired or having little energy?     Poor appetite or overeating? (P) 2-->more than half the days   Feeling bad about yourself/you are a failure/have let yourself or your family down? (P) 3-->nearly every day   Trouble concentrating on things? (P) 3-->nearly every day   Psychomotor agitation/retardation (P) 0-->not at all   Thoughts about death/dying/suicide (P) 2-->more than half the days   PHQ-9 Total Score (P) 22   How difficult have these problems for you? (P) very difficult     GAD7 Documentation:  Feeling nervous, anxious or on edge (P) 2   Not being able to stop or control worrying (P) 1   Worrying too much about different things (P) 3   Trouble relaxing (P) 3   Being so restless that it is hard to sit still (P) 0   Becoming easily annoyed or irritable (P) 1   Feeling Afraid as if something awful might happen (P) 2   MOISES Total Score (P) 12   How difficult have these problems made it for you? (P) Very difficult     LABS:  Lab Results   Component Value Date    GLUCOSE 94 09/22/2023    BUN 11 09/22/2023    CREATININE 0.92 09/22/2023    EGFRRESULT 72.8 09/22/2023    BCR 12.0 09/22/2023    K 4.3 09/22/2023    CO2 26.0 09/22/2023    CALCIUM 11.1 (H) 09/22/2023    PROTENTOTREF 8.5 09/22/2023    ALBUMIN 4.8 09/22/2023    BILITOT 0.6 09/22/2023    AST 28 09/22/2023    ALT 19 09/22/2023     CBC          9/22/2023    09:46   CBC   WBC 4.56    RBC 4.55    Hemoglobin  13.7    Hematocrit 40.5    MCV 89.0    MCH 30.1    MCHC 33.8    RDW 12.8    Platelets 221      Lipid Panel          9/22/2023    09:46   Lipid Panel   Total Cholesterol 192    Triglycerides 103    HDL Cholesterol 65    VLDL Cholesterol 18    LDL Cholesterol  109    LDL/HDL Ratio 1.64      TSH          9/22/2023    09:46   TSH   TSH 3.850        Allergies   Allergen Reactions    Erythromycin Anaphylaxis    Butorphanol        Current Outpatient Medications   Medication Instructions    metoprolol succinate XL (TOPROL-XL) 50 mg, Oral, Daily    SUMAtriptan (IMITREX) 100 mg, Oral, Every 2 Hours PRN    traZODone (DESYREL) 100 MG tablet Take 1 tablet  as needed for sleep.     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    MEDICATION ISSUES:  ADAMA: Reviewed 07/16/2024      Medications Discontinued During This Encounter   Medication Reason    vilazodone (VIIBRYD) 40 MG tablet tablet Side effects             No orders of the defined types were placed in this encounter.    -Barriers: history of multiple failed medications due to lack of efficacy and/or side effects   -Strengths:  strong support system and pets    -Short-Term Goals: Pt will be compliant with medication management and note improvement in S/S over the next 4 to 6 weeks or at next scheduled visit.  -Long-Term Goals: Pt will be compliant with the agreed treatment plan including medication regimen & F/U appt's and deny impairment in daily functioning over the next 6 months.      -Progress toward goal: Not at goal  -Functional Status: Minor impairment   -Prognosis: Fair with Ongoing Treatment        SUMMARY/EDUCATION/DISCUSSION:  -Pt was given appropriate time to ask questions and concerns were addressed. A thorough  discussion was had that included review of disease process, need for continued monitoring and additional treatment options including use of pharmacological and non-pharmacological approaches to care, decisions were made and agreed upon by patient and provider.   -Discussed the risks, benefits, and potential side effects of the medications; patient ackowledged and verbally consented.   -Please call the office at [phone] with any worsening of symptoms or onset of intolerable side effects, please ask to leave a message with Gene's medical assistant.  Please give my office up to 48 hours to respond to a patient call/question/refill request.  -Patient is agreeable to call 911 or go to the nearest ER should he/she/they have any thoughts of harm to self or others.  -Patient has been educated regarding multimodal approach with healthy nutrition, healthy sleep, regular physical activity, social activities, counseling, and medications.     Part of this note may be an electronic transcription/translation of spoken language to printed text using the Dragon Dictation System. Some of the data in this electronic note has been brought forward from a previous encounter, any necessary changes have been made, it has been reviewed by this APRN, and it is accurate.    This document has been electronically signed by JORDYN Jackson July 17, 2024 13:08 EDT

## 2024-07-17 NOTE — PATIENT INSTRUCTIONS
GENERAL NEW PATIENT INSTRUCTIONS:    -Please arrive in person or virtually 10-15 minutes prior to appointment to allow for registration/sign in/questionnaires. If you are seen virtually please review after visit summary (AVS)/patient instructions via thinkingphones for a summary of plan of care/changes in treatment plan. If you are having difficulties logging on or accessing thinkingphones please contact my office for assistance.    -The best way to get a hold of Gene is to call the office at [phone] and ask to leave a message with his medical assistant. Gene Maldonado is a Psychiatric Mental Health Nurse Practitioner, due to his specialty patient's are not able to message him directly via thinkingphones. Please give my office up to 48 hours to respond to a patient call/question/refill request. Refill requests will be made during normal office hours only, Monday-Friday 8:00-5:30.      -Gene is out of the office on Fridays and weekends. Tuesdays and Thursdays are Gene's in-office days, Mondays and Wednesdays are his telehealth days.  The decision to be seen virtually or in person is up to the discretion of the provider, not all behavioral health problems are appropriate for telehealth.    -Please call the office at [phone] with any worsening of symptoms or onset of intolerable side effects.  -Follow-up appointments must be maintained in order for prescribing to continue.  -Patient has been educated regarding multimodal approach with healthy nutrition, healthy sleep, regular physical activity, social activities, counseling, and medications.   -Please call 911 or go to the nearest ER if you begin to have thoughts of harming yourself or other people.    No show policy:  We understand unexpected circumstances arise; however, anytime you miss your appointment we are unable to provide you appropriate care.  In addition, each appointment missed could have been used to provide care for others.  We ask that you call at least 24 hours in  advance to cancel or reschedule an appointment. We would like to take this opportunity to remind you of our policy stating patients who miss THREE or more appointments without cancelling or rescheduling 24 hours in advance of the appointment may be subject to cancellation of any further visits with our clinic and recommendation to seek in-person services/visits. Please call [phone] to reschedule your appointment. If there are reasons that make it difficult for you to keep the appointments, please call and let us know how we can help. Please understand that medication prescribing will not continue without seeing your provider.

## 2024-07-25 ENCOUNTER — TELEPHONE (OUTPATIENT)
Dept: BEHAVIORAL HEALTH | Facility: CLINIC | Age: 59
End: 2024-07-25
Payer: MEDICAID

## 2024-07-25 NOTE — TELEPHONE ENCOUNTER
Patient called office to inquire about potential Spravato treatment, mentioned to Ryan at her last office visit. Patient is interested in going forward, wants a bit more information on the process if you are able to supply that. Patient states she would prefer a phone call back if you would like to give that information to me to relay.    Patient was informed that she has [place] medicaid insurance, she cannot get Spravato treatment at [facility] for this reason. Patient informed that one office in [place] is taking referrals ([facility]). Patient is interested in paying out of pocket through [place] if she must, but she doesn't have any idea about how much this would cost. Do  you have a way of knowing that?    Patient will be back home Sunday, is in [place] currently.

## 2024-07-26 NOTE — TELEPHONE ENCOUNTER
Tell her she can check out the website [email or web address], I do not know how much she would pay out-of-pocket for it it might be expensive, and yes [facility] one of the only [facility] places that I know of that offer it.  But you will go once or twice a week for several weeks and then if you improve you stop and come back maybe in 6 months or a year to have maintenance treatments.  They give you the nasal spray you do not take at home and then you are monitored for at least 30 minutes, they prefer that you have a ride home due to the medication potentially making them drowsy or out of it mentally.

## 2024-07-26 NOTE — TELEPHONE ENCOUNTER
Patient would like if you could refer to [facility] for spravato nasal treatments. Will send patient information via [employer] for [facility].

## 2024-07-27 DIAGNOSIS — F32.9 TREATMENT-RESISTANT DEPRESSION: Primary | ICD-10-CM

## 2024-08-07 NOTE — TELEPHONE ENCOUNTER
Spoke with patient to inform them we have been unsuccessful in reaching [facility] for Spravato treatment. Patient states she has been called by [facility] for scheduling, but declined at the advice of her therapist for the time being. She will schedule with them when she believes she needs to, but for now is fine.

## 2024-09-03 ENCOUNTER — HOSPITAL ENCOUNTER (OUTPATIENT)
Facility: HOSPITAL | Age: 59
Discharge: HOME | End: 2024-09-03
Attending: EMERGENCY MEDICINE | Admitting: EMERGENCY MEDICINE
Payer: MEDICAID

## 2024-09-03 VITALS
RESPIRATION RATE: 20 BRPM | HEART RATE: 88 BPM | WEIGHT: 154 LBS | BODY MASS INDEX: 24.75 KG/M2 | DIASTOLIC BLOOD PRESSURE: 78 MMHG | SYSTOLIC BLOOD PRESSURE: 148 MMHG | OXYGEN SATURATION: 100 % | TEMPERATURE: 97.9 F | HEIGHT: 66 IN

## 2024-09-03 DIAGNOSIS — N89.8 VAGINAL ITCHING: ICD-10-CM

## 2024-09-03 DIAGNOSIS — R30.0 DYSURIA: Primary | ICD-10-CM

## 2024-09-03 LAB
BILIRUB UR QL STRIP: NEGATIVE
CLARITY UR: ABNORMAL
COLOR UR: YELLOW
GLUCOSE UR STRIP-MCNC: NEGATIVE MG/DL
HGB UR QL STRIP.AUTO: ABNORMAL
KETONES UR QL STRIP: ABNORMAL
LEUKOCYTE ESTERASE UR QL STRIP.AUTO: ABNORMAL
NITRITE UR QL STRIP: NEGATIVE
PH UR STRIP.AUTO: 5.5 [PH] (ref 5–8)
PROT UR QL STRIP: NEGATIVE
SP GR UR STRIP: 1.01 (ref 1–1.03)
UROBILINOGEN UR QL STRIP: ABNORMAL

## 2024-09-03 PROCEDURE — 81001 URINALYSIS AUTO W/SCOPE: CPT | Performed by: EMERGENCY MEDICINE

## 2024-09-03 PROCEDURE — 87086 URINE CULTURE/COLONY COUNT: CPT | Performed by: EMERGENCY MEDICINE

## 2024-09-03 PROCEDURE — 87798 DETECT AGENT NOS DNA AMP: CPT

## 2024-09-03 PROCEDURE — 87147 CULTURE TYPE IMMUNOLOGIC: CPT | Performed by: EMERGENCY MEDICINE

## 2024-09-03 PROCEDURE — 87491 CHLMYD TRACH DNA AMP PROBE: CPT

## 2024-09-03 PROCEDURE — G0463 HOSPITAL OUTPT CLINIC VISIT: HCPCS

## 2024-09-03 PROCEDURE — 87801 DETECT AGNT MULT DNA AMPLI: CPT

## 2024-09-03 PROCEDURE — 87661 TRICHOMONAS VAGINALIS AMPLIF: CPT

## 2024-09-03 PROCEDURE — 87186 SC STD MICRODIL/AGAR DIL: CPT | Performed by: EMERGENCY MEDICINE

## 2024-09-03 PROCEDURE — 87591 N.GONORRHOEAE DNA AMP PROB: CPT

## 2024-09-03 RX ORDER — FLUCONAZOLE 150 MG/1
150 TABLET ORAL ONCE
Qty: 1 TABLET | Refills: 0 | Status: SHIPPED | OUTPATIENT
Start: 2024-09-03 | End: 2024-09-03

## 2024-09-03 RX ORDER — CEPHALEXIN 500 MG/1
500 CAPSULE ORAL 2 TIMES DAILY
Qty: 14 CAPSULE | Refills: 0 | Status: SHIPPED | OUTPATIENT
Start: 2024-09-03 | End: 2024-09-10

## 2024-09-03 RX ORDER — NYSTATIN 100000 [USP'U]/G
POWDER TOPICAL 2 TIMES DAILY
Qty: 30 G | Refills: 0 | Status: SHIPPED | OUTPATIENT
Start: 2024-09-03

## 2024-09-03 RX ORDER — FLUCONAZOLE 150 MG/1
150 TABLET ORAL ONCE
Status: COMPLETED | OUTPATIENT
Start: 2024-09-03 | End: 2024-09-03

## 2024-09-03 RX ORDER — CEPHALEXIN 500 MG/1
500 CAPSULE ORAL ONCE
Status: COMPLETED | OUTPATIENT
Start: 2024-09-03 | End: 2024-09-03

## 2024-09-03 RX ADMIN — FLUCONAZOLE 150 MG: 150 TABLET ORAL at 21:57

## 2024-09-03 RX ADMIN — CEPHALEXIN 500 MG: 500 CAPSULE ORAL at 21:57

## 2024-09-04 LAB
BACTERIA UR QL AUTO: ABNORMAL /HPF
HYALINE CASTS UR QL AUTO: ABNORMAL /LPF
RBC # UR STRIP: ABNORMAL /HPF
REF LAB TEST METHOD: ABNORMAL
SQUAMOUS #/AREA URNS HPF: ABNORMAL /HPF
WBC # UR STRIP: ABNORMAL /HPF

## 2024-09-04 NOTE — PROGRESS NOTES
Patient treated with Keflex and fluconazole.  Urine culture and sensitivity still pending.  New swab is still pending

## 2024-09-04 NOTE — FSED PROVIDER NOTE
Subjective   History of Present Illness  Patient is a 58-year-old female with PMH significant for HTN, migraine, PTSD, substance abuse who presents with complaints of dysuria and vaginal itching x 4 days.  She reports about 4 days she noticed vaginal itching as well as dysuria but was unsure if this was a UTI or a yeast infection.  She has been treating with over-the-counter Famiclear with some relief.  She has also noticed increased urinary frequency with decreased urine production.  She denies any nausea, vomiting, fever, chills, abdominal pain.  She does not have any vaginal discharge.  She does not have a history of frequent UTIs.  She denies any other systemic complaints at this time.        Review of Systems   Constitutional:  Negative for chills and fever.   HENT:  Negative for congestion and sore throat.    Eyes:  Negative for visual disturbance.   Respiratory:  Negative for cough and shortness of breath.    Cardiovascular:  Negative for chest pain and leg swelling.   Gastrointestinal:  Negative for abdominal pain, constipation, diarrhea, nausea and vomiting.   Endocrine: Negative for polyuria.   Genitourinary:  Positive for decreased urine volume, dysuria, frequency and urgency. Negative for flank pain, hematuria, pelvic pain and vaginal discharge.        Vaginal itching   Musculoskeletal:  Negative for arthralgias, gait problem, myalgias and neck stiffness.   Skin:  Negative for color change, pallor, rash and wound.   Neurological:  Negative for dizziness, syncope and headaches.   Psychiatric/Behavioral:  Negative for confusion. The patient is not nervous/anxious.        Past Medical History:   Diagnosis Date    Alcohol abuse 2009    I am almost 2 years sober    Alcoholism 2009    I am almost 2 years sober    Anxiety     Depression     Fatigue     Hypertension     IBS (irritable bowel syndrome)     Migraine headache     Panic disorder 1970    Pneumonia     PTSD (post-traumatic stress disorder) 1971     Recently found out I qualifiy for this    Rosacea     Self-injurious behavior     Substance abuse        Allergies   Allergen Reactions    Erythromycin Anaphylaxis    Butorphanol        Past Surgical History:   Procedure Laterality Date    ABDOMINAL SURGERY       SECTION      CHOLECYSTECTOMY      COLONOSCOPY      TOOTH EXTRACTION         Family History   Problem Relation Age of Onset    Heart disease Mother     Stroke Mother     Alcohol abuse Mother     Heart attack Father     Diabetes Father     Hypertension Father        Social History     Socioeconomic History    Marital status:    Tobacco Use    Smoking status: Former     Current packs/day: 0.00     Average packs/day: 1.5 packs/day for 20.0 years (30.0 ttl pk-yrs)     Types: Cigarettes     Start date: 1980     Quit date: 2000     Years since quittin.6     Passive exposure: Past    Smokeless tobacco: Never   Vaping Use    Vaping status: Never Used   Substance and Sexual Activity    Alcohol use: Not Currently    Drug use: No    Sexual activity: Yes     Partners: Male     Birth control/protection: Post-menopausal           Objective   Physical Exam  Constitutional:       General: She is not in acute distress.     Appearance: Normal appearance. She is normal weight. She is not ill-appearing, toxic-appearing or diaphoretic.   Cardiovascular:      Rate and Rhythm: Normal rate.   Pulmonary:      Effort: Pulmonary effort is normal.   Abdominal:      General: Abdomen is flat. There is no distension.      Palpations: Abdomen is soft.      Tenderness: There is no abdominal tenderness. There is no right CVA tenderness, left CVA tenderness, guarding or rebound.   Musculoskeletal:         General: Normal range of motion.      Cervical back: Normal range of motion.   Skin:     General: Skin is warm and dry.   Neurological:      General: No focal deficit present.      Mental Status: She is alert.   Psychiatric:         Mood and Affect: Mood  normal.         Behavior: Behavior normal.         Procedures           ED Course                                           Medical Decision Making  Patient is a 58-year-old female who presents for dysuria and vaginal itching.  She overall appears well, no acute stress, nontoxic.  Vital signs are WNL.  Exam is unremarkable.  Urinalysis does reveal leukocytes and blood.  UTI considered unlikely.  Also discussed possibility of vulvovaginal candidiasis, atrophic vaginitis, STD.  Recommend we cover for UTI and candidiasis, with close follow-up with her PCP.  Patient does consent to Nuswab self swab.  Patient is agreeable with overall plan and is wanting to make an appointment with her PCP.  Discussed when to return to the emergency department.  Answered all questions.  Patient verbalized understanding and was agreeable to plan and discharge.    My differential diagnosis includes was not with to: UTI, cystitis, pyelonephritis, kidney stone, vulvovaginal candidiasis, atrophic vaginitis, STD      Problems Addressed:  Dysuria: complicated acute illness or injury  Vaginal itching: complicated acute illness or injury    Amount and/or Complexity of Data Reviewed  Labs: ordered.    Risk  Prescription drug management.        Final diagnoses:   Dysuria   Vaginal itching       ED Disposition  ED Disposition       ED Disposition   Discharge    Condition   Stable    Comment   --               Lissette Sesay MD  [address] [address]  [phone]    Schedule an appointment as soon as possible for a visit            Medication List        New Prescriptions      cephalexin 500 MG capsule  Commonly known as: KEFLEX  Take 1 capsule by mouth 2 (Two) Times a Day for 7 days.     fluconazole 150 MG tablet  Commonly known as: DIFLUCAN  Take 1 tablet by mouth 1 (One) Time for 1 dose. Take in 1 week if yeast infection occurs.     nystatin 161545 UNIT/GM powder  Commonly known as: MYCOSTATIN  Apply  topically to the  appropriate area as directed 2 (Two) Times a Day.               Where to Get Your Medications        These medications were sent to [facility] [id number] - [place], [place] - [address] AT [facility] [address] [facility] [phone] [facility] [phone]   [address], [facility] [address]      Phone: [phone]   cephalexin 500 MG capsule  fluconazole 150 MG tablet  nystatin [id number] UNIT/GM powder

## 2024-09-04 NOTE — DISCHARGE INSTRUCTIONS
First dose of antibiotic given in ER tonight.  Next dose due tomorrow morning.  Take all doses as prescribed until completion.    Also given you Diflucan tonight to cover for yeast.  I have sent an additional dose and that you can take in 1 week for yeast infection occurs from your antibiotics.    I have also prescribed a topical yeast infection treatments nystatin powder.  Use as directed.    Return to emergency department for worsening symptoms or other medical emergencies.  Follow-up with your PCP for ongoing evaluation and management as discussed.  Refer to the attached instructions for further information.

## 2024-09-06 ENCOUNTER — TELEPHONE (OUTPATIENT)
Dept: FAMILY MEDICINE CLINIC | Facility: CLINIC | Age: 59
End: 2024-09-06
Payer: MEDICAID

## 2024-09-06 LAB
A VAGINAE DNA VAG QL NAA+PROBE: NORMAL SCORE
BACTERIA SPEC AEROBE CULT: ABNORMAL
BVAB2 DNA VAG QL NAA+PROBE: NORMAL SCORE
C ALBICANS DNA VAG QL NAA+PROBE: NEGATIVE
C GLABRATA DNA VAG QL NAA+PROBE: NEGATIVE
C TRACH DNA SPEC QL NAA+PROBE: NEGATIVE
MEGA1 DNA VAG QL NAA+PROBE: NORMAL SCORE
N GONORRHOEA DNA VAG QL NAA+PROBE: NEGATIVE
T VAGINALIS DNA VAG QL NAA+PROBE: NEGATIVE

## 2024-09-06 NOTE — TELEPHONE ENCOUNTER
Patient is wanting to know if she could try   Effexor again. She took it 15 years ago.    Please advise

## 2024-09-09 DIAGNOSIS — F32.9 TREATMENT-RESISTANT DEPRESSION: Primary | ICD-10-CM

## 2024-09-09 DIAGNOSIS — F41.1 GAD (GENERALIZED ANXIETY DISORDER): ICD-10-CM

## 2024-09-09 RX ORDER — VENLAFAXINE HYDROCHLORIDE 37.5 MG/1
CAPSULE, EXTENDED RELEASE ORAL
Qty: 114 CAPSULE | Refills: 0 | Status: SHIPPED | OUTPATIENT
Start: 2024-09-09 | End: 2024-10-21

## 2024-09-27 ENCOUNTER — OFFICE VISIT (OUTPATIENT)
Dept: FAMILY MEDICINE CLINIC | Facility: CLINIC | Age: 59
End: 2024-09-27
Payer: MEDICAID

## 2024-09-27 VITALS
OXYGEN SATURATION: 98 % | WEIGHT: 152 LBS | RESPIRATION RATE: 18 BRPM | BODY MASS INDEX: 24.43 KG/M2 | SYSTOLIC BLOOD PRESSURE: 130 MMHG | HEART RATE: 68 BPM | DIASTOLIC BLOOD PRESSURE: 82 MMHG | HEIGHT: 66 IN

## 2024-09-27 DIAGNOSIS — Z12.31 ENCOUNTER FOR SCREENING MAMMOGRAM FOR BREAST CANCER: ICD-10-CM

## 2024-09-27 DIAGNOSIS — Z00.00 HEALTHCARE MAINTENANCE: ICD-10-CM

## 2024-09-27 DIAGNOSIS — Z12.11 ENCOUNTER FOR SCREENING FOR MALIGNANT NEOPLASM OF COLON: Primary | ICD-10-CM

## 2024-09-27 LAB
ALBUMIN SERPL-MCNC: 4.5 G/DL (ref 3.5–5.2)
ALBUMIN/GLOB SERPL: 1.4 G/DL
ALP SERPL-CCNC: 64 U/L (ref 39–117)
ALT SERPL-CCNC: 26 U/L (ref 1–33)
AST SERPL-CCNC: 35 U/L (ref 1–32)
BILIRUB SERPL-MCNC: 0.5 MG/DL (ref 0–1.2)
BUN SERPL-MCNC: 6 MG/DL (ref 6–20)
BUN/CREAT SERPL: 7.1 (ref 7–25)
CALCIUM SERPL-MCNC: 10.7 MG/DL (ref 8.6–10.5)
CHLORIDE SERPL-SCNC: 100 MMOL/L (ref 98–107)
CHOLEST SERPL-MCNC: 134 MG/DL (ref 0–200)
CO2 SERPL-SCNC: 25 MMOL/L (ref 22–29)
CREAT SERPL-MCNC: 0.85 MG/DL (ref 0.57–1)
EGFRCR SERPLBLD CKD-EPI 2021: 79.5 ML/MIN/1.73
ERYTHROCYTE [DISTWIDTH] IN BLOOD BY AUTOMATED COUNT: 12.4 % (ref 12.3–15.4)
GLOBULIN SER CALC-MCNC: 3.2 GM/DL
GLUCOSE SERPL-MCNC: 101 MG/DL (ref 65–99)
HCT VFR BLD AUTO: 40.5 % (ref 34–46.6)
HDLC SERPL-MCNC: 43 MG/DL (ref 40–60)
HGB BLD-MCNC: 13.9 G/DL (ref 12–15.9)
LDLC SERPL CALC-MCNC: 71 MG/DL (ref 0–100)
LDLC/HDLC SERPL: 1.6 {RATIO}
MCH RBC QN AUTO: 31 PG (ref 26.6–33)
MCHC RBC AUTO-ENTMCNC: 34.3 G/DL (ref 31.5–35.7)
MCV RBC AUTO: 90.4 FL (ref 79–97)
PLATELET # BLD AUTO: 233 10*3/MM3 (ref 140–450)
POTASSIUM SERPL-SCNC: 3.8 MMOL/L (ref 3.5–5.2)
PROT SERPL-MCNC: 7.7 G/DL (ref 6–8.5)
RBC # BLD AUTO: 4.48 10*6/MM3 (ref 3.77–5.28)
SODIUM SERPL-SCNC: 139 MMOL/L (ref 136–145)
TRIGL SERPL-MCNC: 112 MG/DL (ref 0–150)
TSH SERPL DL<=0.005 MIU/L-ACNC: 2.55 UIU/ML (ref 0.27–4.2)
VLDLC SERPL CALC-MCNC: 20 MG/DL (ref 5–40)
WBC # BLD AUTO: 4.12 10*3/MM3 (ref 3.4–10.8)

## 2024-09-27 PROCEDURE — 99396 PREV VISIT EST AGE 40-64: CPT | Performed by: FAMILY MEDICINE

## 2024-09-27 PROCEDURE — 1125F AMNT PAIN NOTED PAIN PRSNT: CPT | Performed by: FAMILY MEDICINE

## 2024-09-27 PROCEDURE — 1160F RVW MEDS BY RX/DR IN RCRD: CPT | Performed by: FAMILY MEDICINE

## 2024-09-27 PROCEDURE — 1159F MED LIST DOCD IN RCRD: CPT | Performed by: FAMILY MEDICINE

## 2024-10-14 DIAGNOSIS — F41.1 GAD (GENERALIZED ANXIETY DISORDER): ICD-10-CM

## 2024-10-14 DIAGNOSIS — F32.9 TREATMENT-RESISTANT DEPRESSION: ICD-10-CM

## 2024-10-15 RX ORDER — VENLAFAXINE HYDROCHLORIDE 150 MG/1
150 CAPSULE, EXTENDED RELEASE ORAL DAILY
Qty: 30 CAPSULE | Refills: 0 | Status: SHIPPED | OUTPATIENT
Start: 2024-10-15

## 2024-10-21 ENCOUNTER — TELEPHONE (OUTPATIENT)
Dept: FAMILY MEDICINE CLINIC | Facility: CLINIC | Age: 59
End: 2024-10-21
Payer: MEDICAID

## 2024-10-21 DIAGNOSIS — F41.1 GAD (GENERALIZED ANXIETY DISORDER): ICD-10-CM

## 2024-10-21 DIAGNOSIS — F32.9 TREATMENT-RESISTANT DEPRESSION: ICD-10-CM

## 2024-10-21 RX ORDER — VENLAFAXINE HYDROCHLORIDE 37.5 MG/1
CAPSULE, EXTENDED RELEASE ORAL
Qty: 114 CAPSULE | Refills: 0 | Status: SHIPPED | OUTPATIENT
Start: 2024-10-21

## 2024-10-21 NOTE — TELEPHONE ENCOUNTER
Spoke with patient and have her the information for the [facility]. She will be calling to establish care.

## 2024-10-21 NOTE — TELEPHONE ENCOUNTER
Patient calling regarding notification that Ryan has left the practice. Would like to know where to go for care. Please advise.

## 2024-11-06 ENCOUNTER — TELEPHONE (OUTPATIENT)
Dept: GASTROENTEROLOGY | Facility: CLINIC | Age: 59
End: 2024-11-06
Payer: MEDICAID

## 2024-11-06 ENCOUNTER — PREP FOR SURGERY (OUTPATIENT)
Dept: OTHER | Facility: HOSPITAL | Age: 59
End: 2024-11-06
Payer: MEDICAID

## 2024-11-06 DIAGNOSIS — R19.5 POSITIVE COLORECTAL CANCER SCREENING USING COLOGUARD TEST: Primary | ICD-10-CM

## 2024-11-06 DIAGNOSIS — Z12.11 ENCOUNTER FOR SCREENING FOR MALIGNANT NEOPLASM OF COLON: Primary | ICD-10-CM

## 2024-11-06 NOTE — TELEPHONE ENCOUNTER
POSITIVE COLOGUARD    NO PERSONAL HX OF POLYPS    NO FAMILY HX OF POLYPS    NO FAMILY HX OF COLON CA    NO ASA OR BLOOD THINNERS              LIST OF  MEDICATIONS  METOPROLOL  VITAMIN D  VITAMIN B  MULTI VITAMIN  SUMATRIPTAN  VENLAFAXINE  TRAZODONE      OA QUESTIONNAIRE SCANNED IN MEDIA

## 2024-11-07 ENCOUNTER — PREP FOR SURGERY (OUTPATIENT)
Dept: OTHER | Facility: HOSPITAL | Age: 59
End: 2024-11-07
Payer: MEDICAID

## 2024-11-11 PROBLEM — Z12.11 ENCOUNTER FOR SCREENING FOR MALIGNANT NEOPLASM OF COLON: Status: ACTIVE | Noted: 2024-11-06

## 2024-11-14 DIAGNOSIS — F41.1 GAD (GENERALIZED ANXIETY DISORDER): ICD-10-CM

## 2024-11-14 DIAGNOSIS — F32.9 TREATMENT-RESISTANT DEPRESSION: ICD-10-CM

## 2024-11-14 RX ORDER — VENLAFAXINE HYDROCHLORIDE 150 MG/1
150 CAPSULE, EXTENDED RELEASE ORAL DAILY
Qty: 30 CAPSULE | Refills: 0 | Status: SHIPPED | OUTPATIENT
Start: 2024-11-14

## 2024-11-14 NOTE — TELEPHONE ENCOUNTER
LAST REFILL - 10/15/24  LAST VISIT - 07/16/24  NEXT VISIT - new psych appt 11/20/24    Venlafaxine 37.5mg dose was sent to pharmacy 10/21/24; this was used to titrate patient up to current dose at 150mg per day. Patient is currently taking 150mg per day. Routing to covering provider Dr. Morillo due to Ryan Maldonado offboarding. Please advise.

## 2024-11-19 ENCOUNTER — HOSPITAL ENCOUNTER (OUTPATIENT)
Dept: MAMMOGRAPHY | Facility: HOSPITAL | Age: 59
Discharge: HOME | End: 2024-11-19
Admitting: FAMILY MEDICINE
Payer: MEDICAID

## 2024-11-19 DIAGNOSIS — I10 BENIGN ESSENTIAL HYPERTENSION: ICD-10-CM

## 2024-11-19 DIAGNOSIS — Z12.31 ENCOUNTER FOR SCREENING MAMMOGRAM FOR BREAST CANCER: ICD-10-CM

## 2024-11-19 PROCEDURE — 77063 BREAST TOMOSYNTHESIS BI: CPT

## 2024-11-19 PROCEDURE — 77067 SCR MAMMO BI INCL CAD: CPT

## 2024-11-20 ENCOUNTER — TELEPHONE (OUTPATIENT)
Dept: GASTROENTEROLOGY | Facility: CLINIC | Age: 59
End: 2024-11-20
Payer: MEDICAID

## 2024-11-20 ENCOUNTER — OFFICE VISIT (OUTPATIENT)
Age: 59
End: 2024-11-20
Payer: MEDICAID

## 2024-11-20 VITALS
SYSTOLIC BLOOD PRESSURE: 143 MMHG | OXYGEN SATURATION: 98 % | HEIGHT: 66 IN | HEART RATE: 95 BPM | WEIGHT: 150 LBS | BODY MASS INDEX: 24.11 KG/M2 | DIASTOLIC BLOOD PRESSURE: 75 MMHG

## 2024-11-20 DIAGNOSIS — F41.1 GAD (GENERALIZED ANXIETY DISORDER): ICD-10-CM

## 2024-11-20 DIAGNOSIS — F33.2 SEVERE EPISODE OF RECURRENT MAJOR DEPRESSIVE DISORDER, WITHOUT PSYCHOTIC FEATURES: Primary | ICD-10-CM

## 2024-11-20 DIAGNOSIS — F32.9 TREATMENT-RESISTANT DEPRESSION: ICD-10-CM

## 2024-11-20 DIAGNOSIS — F10.21 ALCOHOL USE DISORDER, SEVERE, IN SUSTAINED REMISSION: ICD-10-CM

## 2024-11-20 RX ORDER — METOPROLOL SUCCINATE 50 MG/1
50 TABLET, EXTENDED RELEASE ORAL DAILY
Qty: 90 TABLET | Refills: 1 | Status: SHIPPED | OUTPATIENT
Start: 2024-11-20

## 2024-11-20 RX ORDER — VENLAFAXINE HYDROCHLORIDE 150 MG/1
150 CAPSULE, EXTENDED RELEASE ORAL DAILY
Qty: 30 CAPSULE | Refills: 2 | Status: SHIPPED | OUTPATIENT
Start: 2024-11-20 | End: 2025-02-18

## 2024-11-20 NOTE — PROGRESS NOTES
"Subjective   Graciela Galvan is a 59 y.o. female who presents today for initial evaluation     Chief Complaint:  \"continued management of my depression.\"    History of Present Illness:   Miss Graciela Galvan 59-year-old female seen as a new patient to establish care as previous psychiatric provider, Ryan Maldonado, JORDYN is no longer employed with [facility] [employer].    Patient reports a near lifelong history of experiencing numerous depressive symptoms, stating that she first began to struggle with her mood and associated depressive symptoms as a child/young adolescent.  Patient has been prescribed various antidepressant medications in the past, and began seeing a psychiatrist (Dr. Lundberg) approximately 8 to 9 years ago with whom she saw regularly.  Patient was most recently seen by psychiatric nurse practitioner Ryan Maldonado over the past year or so, with the patient reporting a rather significant worsening of depressive symptoms beginning in April 2024.  More specifically, the patient endorses experiencing a significantly depressed mood associated with progressively worsening anhedonia, decreased levels of energy/fatigue, difficulties initiating/maintaining sleep, low self worth, excessive feelings of guilt, decreased appetite, difficulty sustaining attention/concentration, noticeable psychomotor retardation, and intermittent thoughts of death/suicide described as passive and fleeting in nature that did begin to increase in both frequency and severity around June/July 2024.  Patient further explains that she never experienced any intent to act on these thoughts and never developed a specific plan to harm or kill herself, but would experience thoughts such as \"things would be easier if I were no longer here.\"  Patient also endorses a rather significant decrease in her productivity/motivation during this recent depressive episode resulting in uncharacteristic functional limitations negatively impacting " the patient's ability to remain employed or successfully complete necessary tasks at home.  Patient states that she had never experienced symptoms this severe in the past, and does associate rather significant psychosocial stressors primarily involving interpersonal relationship difficulties between current significant other as a potentially exacerbating factor.  Patient did ultimately voluntarily admit herself to the [facility] psychiatric unit due to the severity of the symptoms in July 2024 for approximately 7 days.  Patient does continue to endorse a mildly depressed mood associated with some mild anhedonia, difficulties initiating and maintaining sleep, decreased levels of energy, and difficulty sustaining attention/concentration but she states that these symptoms have very significantly improved when compared to her clinical status from April to July of this year.  Patient does also consider herself a worrier stating that she has struggled with numerous anxious symptoms throughout most of her adult life as well.  She reports significant improvement in her current anxious symptoms as well, but does report a history of worrying excessively about most things on most days, difficulties controlling/stopping this worry, difficulties winding down/relaxing, frequent feelings of restlessness/feeling on edge, intermittent periods of irritability, and frequent fear that something bad or awful is going to happen.    Patient denies any history of hypomanic/manic symptoms including a persistently elevated mood associated with a decreased need for sleep, increased levels of energy, increased goal-directed behaviors, racing thoughts, pressured speech, or uncharacteristic behavior such as increased sex drive/promiscuity or excessive spending on unnecessary items.  She also denies ever experiencing any auditory, visual, or tactile hallucinations and does not currently appear to be responding to internal stimuli.  She  denies any history of generalized paranoia and displays no evidence of delusional thinking.    Past Psychiatric History:  As mentioned above patient reports a near lifelong history of numerous depressive and anxious symptoms.  She began to see a psychiatrist approximately 8 to 9 years ago and most recently was seen by JORDYN Jackson for approximately 1 year.  Patient is currently prescribed venlafaxine  mg p.o. once daily with rather significant improvement in depressive and anxious symptoms as detailed above.  She reports previous trials of sertraline, duloxetine, escitalopram, citalopram, bupropion, Trintellix, Rexulti, and Paxil.  She reports little to no benefit associated previous trials of these medications.  Patient has been admitted to an inpatient psychiatric unit on 1 occasion at the [facility] in July 2024 as mentioned above.  Patient does also report undergoing IOP program at the [facility] around 2002.  Patient is currently seeing a psychotherapist, Medina Bradford.  She denies any history of suicide attempts.  She does report a history of self-injurious behavior involving superficial cutting as an adolescent and more recently episodes involving the patient hitting herself in the head/face during times of high stress.  Patient states that she has not engaged in any of these types of behaviors in the past several months.    Family Psychiatric History:  The patient reports a history of depression, anxiety, and alcohol dependence in both biological mother and father.    Medical History:  Reviewed via EMR.  Denies any history of seizure disorders or traumatic brain injuries.    Substance Abuse History:  Patient does report a 12-year history of excessive and daily use of alcohol, stating that she did drink approximately 12-18 beers per day during this time frame.  Patient has been sober for approximately 2 years and remains active in AA meetings approximately 3 times a week.  She otherwise denies use of any  other illegal/illicit substance including cannabis, cocaine, methamphetamine, or heroin.    Social History:  Patient was born and raised in [place], [place] and reports a good childhood growing up.  Patient does have an older brother and sister who are 10 and 13 years older than the patient, respectively.  She reports that she still gets along well with her siblings.  Patient states that she never enjoyed school very much and typically received average grades throughout grade school and high school.  Patient did receive a bachelor's degree in criminal justice after completing high school.  Patient is currently unemployed and most recently worked roughly 6 to 7 months ago in [employer].  Patient has been  and  twice, with first marriage lasting approximately 5 years and the second approximately 13 years.  She has 1 adult son from her first marriage with whom she remains close.  Her son lives in [place] and the patient states that she does get to see her 5 grandchildren quite frequently.  Patient has been in a committed relationship with significant other, Bry Morillo, for approximately 16 years.  Patient currently lives with her significant other here in [place], [place].  She denies a history of legal issues or  experience.    The following portions of the patient's history were reviewed and updated as appropriate: allergies, current medications, past family history, past medical history, past social history, past surgical history and problem list.       Past Medical History:  Past Medical History:   Diagnosis Date    Alcohol abuse 2009    I am almost 2 years sober    Alcoholism 2009    I am almost 2 years sober    Anxiety     Depression     Fatigue     Hypertension     IBS (irritable bowel syndrome)     Migraine headache     Panic disorder 1970    Pneumonia     PTSD (post-traumatic stress disorder) 1971    Recently found out I qualifiy for this    Rosacea     Self-injurious  behavior     Substance abuse        Social History:  Social History     Socioeconomic History    Marital status:    Tobacco Use    Smoking status: Former     Current packs/day: 0.00     Average packs/day: 1.5 packs/day for 20.0 years (30.0 ttl pk-yrs)     Types: Cigarettes     Start date: 1980     Quit date: 2000     Years since quittin.9     Passive exposure: Past    Smokeless tobacco: Never   Vaping Use    Vaping status: Never Used   Substance and Sexual Activity    Alcohol use: Not Currently    Drug use: No    Sexual activity: Yes     Partners: Male     Birth control/protection: Post-menopausal       Family History:  Family History   Problem Relation Age of Onset    Heart disease Mother     Stroke Mother     Alcohol abuse Mother     Heart attack Father     Diabetes Father     Hypertension Father        Past Surgical History:  Past Surgical History:   Procedure Laterality Date    ABDOMINAL SURGERY       SECTION      CHOLECYSTECTOMY      COLONOSCOPY      TOOTH EXTRACTION         Problem List:  Patient Active Problem List   Diagnosis    Acne rosacea    Alcohol use disorder, severe, in sustained remission    Subclinical hypothyroidism    Migraine headache    Gastroesophageal reflux disease    Encounter for screening for malignant neoplasm of colon    Severe episode of recurrent major depressive disorder, without psychotic features       Allergy:   Allergies   Allergen Reactions    Erythromycin Anaphylaxis    Butorphanol         Current Medications:   Current Outpatient Medications   Medication Sig Dispense Refill    metoprolol succinate XL (TOPROL-XL) 50 MG 24 hr tablet TAKE 1 TABLET BY MOUTH DAILY 90 tablet 1    SUMAtriptan (IMITREX) 100 MG tablet Take 1 tablet by mouth Every 2 (Two) Hours As Needed for Migraine (total of 2 doses daily). 12 tablet 2    traZODone (DESYREL) 100 MG tablet Take 1 tablet  as needed for sleep. 25 tablet 0    venlafaxine XR (EFFEXOR-XR) 150 MG 24 hr capsule  "TAKE 1 CAPSULE BY MOUTH DAILY 30 capsule 0    venlafaxine XR (EFFEXOR-XR) 37.5 MG 24 hr capsule TAKE 1 CAPSULE BY MOUTH DAILY FOR 4 DAYS THEN TAKE 2 CAPSULES BY MOUTH DAILY FOR 4 DAYS THEN TAKE THREE CAPSULES BY MOUTH DAILY FOR 34 DAYS 114 capsule 0     No current facility-administered medications for this visit.       Review of Symptoms:    Review of Systems   Constitutional:  Positive for activity change and fatigue.   HENT:  Negative for tinnitus.    Eyes:  Negative for visual disturbance.   Endocrine: Negative for cold intolerance and heat intolerance.   Skin:  Negative for rash.   Neurological:  Negative for memory problem and confusion.   Psychiatric/Behavioral:  Positive for decreased concentration, sleep disturbance, depressed mood and stress. Negative for hallucinations, self-injury and suicidal ideas. The patient is nervous/anxious.        Physical Exam:   Blood pressure 143/75, pulse 95, height 167.6 cm (65.98\"), weight 68 kg (150 lb), SpO2 98%, not currently breastfeeding.  Appearance: Appears documented age, appropriate hygiene and grooming.  Gait, Station, Strength: Normal gait, station, and strength.       Mental Status Exam:   Hygiene:   good  Cooperation:  Cooperative  Eye Contact:  Good  Psychomotor Behavior:  Appropriate  Affect:  Full range and Appropriate  Mood: normal and euthymic  Hopelessness: Denies  Speech:  Normal  Thought Process:  Goal directed and Linear  Thought Content:  Normal  Suicidal:  None  Homicidal:  None  Hallucinations:  None  Delusion:  None  Memory:  Intact  Orientation:  Person, Place, Time, and Situation  Reliability:  good  Insight:  Good  Judgement:  Good  Impulse Control:  Good      Lab Results:   No visits with results within 1 Month(s) from this visit.   Latest known visit with results is:   Results Encounter on 09/27/2024   Component Date Value Ref Range Status    Cologuard 10/29/2024 Positive (A)  Negative Final    Comment:   POSITIVE TEST RESULT. A positive " Cologuard result should be followed with a colonoscopy or visual examination of the colon. The normal value (reference range) for this assay is negative.    TEST DESCRIPTION: Composite algorithmic analysis of stool DNA-biomarkers with hemoglobin immunoassay.   Quantitative values of individual biomarkers are not reportable and are not associated with individual biomarker result reference ranges. Cologuard is intended for colorectal cancer screening of adults of either sex, 45 years or older, who are at average-risk for colorectal cancer (CRC). Cologuard has been approved for use by the U.S. FDA. The performance of Cologuard was established in a cross sectional study of average-risk adults aged 50-84. Cologuard performance in patients ages 45 to 49 years was estimated by sub-group analysis of near-age groups. Colonoscopies performed for a positive result may find as the most clinically significant lesion: colorectal cancer [4.0%], advanced adenoma                            (including sessile serrated polyps greater than or equal to 1cm diameter) [20%] or non- advanced adenoma [31%]; or no colorectal neoplasia [45%]. These estimates are derived from a prospective cross-sectional screening study of 10,000 individuals at average risk for colorectal cancer who were screened with both Cologuard and colonoscopy. (Baudilio MCMILLAN. et al, N Engl J Med 2014;370(14):8288-1404.) Cologuard may produce a false negative or false positive result (no colorectal cancer or precancerous polyp present at colonoscopy follow up). A negative Cologuard test result does not guarantee the absence of CRC or advanced adenoma (pre-cancer). The current Cologuard screening interval is every 3 years. (American Cancer Society and U.S. Multi-Society Task Force). Cologuard performance data in a 10,000 patient pivotal study using colonoscopy as the reference method can be accessed at the following location: [email or web address]/results. Additional  description of the Cologuard test process,                            warnings and precautions can be found at www.[facility].com.         PHQ-9 Total Score: 15     Assessment & Plan    Diagnoses and all orders for this visit:    1. Severe episode of recurrent major depressive disorder, without psychotic features (Primary)    2. Alcohol use disorder, severe, in sustained remission  Overview:  Nida 8/22/2022  She quit 65 days ago!!!!!  Nida 8/16/2023 still sober and doing well           Miss Galvan is a 59-year-old female seen as a new patient to establish care as her most recent psychiatric provider is no longer employed with [facility].    Upon today's evaluation patient rather clearly reports and displays numerous signs and symptoms most consistent with that of major depressive disorder, generalized anxiety disorder, and alcohol use disorder in sustained remission.  As detailed above, patient reports a near lifelong history of experiencing numerous depressive and anxious symptoms and has received psychiatric care consistently over the past decade or so.  The patient does report a progressive worsening of depressive symptoms beginning in April of this year, ultimately resulting in a 7-day inpatient hospitalization at the [facility] in July 2024.  Patient has since been initiated on venlafaxine and is currently taking 1 to 50 mg p.o. once daily.  Patient has been tried on various antidepressant medications in the past as detailed above with little to no therapeutic benefit associated with her past psychiatric medications.  Fortunately, patient reports a rather significant benefit associated with her current dose of venlafaxine and states that her clinical status has very significantly improved when compared to her status several months ago beginning in April of this year.  Patient does also report a history of alcohol use disorder, stating that she has been sober for approximately  2 years and remains active in weekly AA meetings 3 times per week.  Patient does also report currently being active in psychotherapy stating that she has seen her current therapist since April of this year as well.  Given the rather significant benefit associated with the patient's current medication regimen and other nonpharmacological interventions, I recommend continuation of venlafaxine  mg p.o. once daily at this time and will make no further adjustments that that is the patient's medication regimen at this time.  Patient will be seen in approximately 12 weeks for reassessment, and was encouraged to contact our clinic at any time over the next 3 months if she were to experience any acute exacerbations worsening of her psychiatric symptoms to discuss scheduling the patient for an earlier visit.  Patient voices understanding of this and is agreeable to today's plan.      Medications:  -Continue venlafaxine  mg p.o. once daily for management of major depressive disorder.      TREATMENT PLAN - SHORT AND LONG-TERM GOALS:   -Continue supportive psychotherapy efforts and medications as indicated. Treatment and medication options discussed during today's visit.   -Patient acknowledged and verbally consented to continue with current treatment plan and was educated on the importance of compliance with treatment and follow-up appointments.    SUMMARY/EDUCATION/DISCUSSION:  -Pt was given appropriate time to ask questions and concerns were addressed. A thorough discussion was had that included review of disease process, need for continued monitoring and additional treatment options including use of pharmacological and non-pharmacological approaches to care, decisions were made and agreed upon by patient and provider.   -Discussed medication options and treatment plan of prescribed medication as well as the risks, benefits, and side effects including potential falls, possible impaired driving and metabolic  adversities among others; patient acknowledged and provided verbal consent.   -Patient has been educated regarding multimodal approach with healthy nutrition, healthy sleep, regular physical activity, social activities, counseling, and medications.  -Please call the office at [phone] within normal business hours (Monday-Friday, 8:00 AM - 4:30 PM) with any worsening of symptoms or onset of intolerable side effects. Please ask to leave a message with office staff.  Please allow up to 24-48 hours for response to a patient call/question/refill request.  -Safety plan has been established and discussed in detail with the patient, who is agreeable to contact support system and/or call 911 or go to the nearest ER should he/she/they have any thoughts of harm to self or others.    MEDS ORDERED DURING VISIT:  No orders of the defined types were placed in this encounter.      FOLLOW UP:  Return in about 12 weeks (around 2/12/2025) for Next scheduled follow up.      Everton Newell DO    This document has been electronically signed by Everton Newell DO  November 20, 2024 14:26 EST    Part of this note may be an electronic transcription/translation of spoken language to printed text using the Dragon Dictation System. Some of the data in this electronic note has been brought forward from a previous encounter, any necessary changes have been made, it has been reviewed by this provider, and it is accurate.

## 2024-11-26 ENCOUNTER — TELEPHONE (OUTPATIENT)
Dept: GASTROENTEROLOGY | Facility: CLINIC | Age: 59
End: 2024-11-26

## 2024-11-26 NOTE — TELEPHONE ENCOUNTER
Hub staff attempted to follow warm transfer process and was unsuccessful     Caller: Graciela Galvan    Relationship to patient: Self    Best call back number: [phone]    Patient is needing: PT HAS A C-SCOPE SCHED. FOR 01/13 BUT HER COLOGUARD TEST CAME BACK POSITIVE.  CAN THE C-SCOPE BE MOVED UP TO DEC?  PLEASE ADVISE.

## 2024-12-19 ENCOUNTER — OUTSIDE FACILITY SERVICE (OUTPATIENT)
Dept: GASTROENTEROLOGY | Facility: CLINIC | Age: 59
End: 2024-12-19
Payer: MEDICAID

## 2024-12-19 RX ORDER — HYDROCORTISONE 25 MG/G
CREAM TOPICAL 2 TIMES DAILY
Qty: 28 G | Refills: 5 | Status: SHIPPED | OUTPATIENT
Start: 2024-12-19

## 2025-02-12 ENCOUNTER — OFFICE VISIT (OUTPATIENT)
Age: 60
End: 2025-02-12
Payer: MEDICAID

## 2025-02-12 VITALS
OXYGEN SATURATION: 92 % | HEIGHT: 66 IN | SYSTOLIC BLOOD PRESSURE: 105 MMHG | BODY MASS INDEX: 23.75 KG/M2 | WEIGHT: 147.8 LBS | HEART RATE: 75 BPM | DIASTOLIC BLOOD PRESSURE: 68 MMHG

## 2025-02-12 DIAGNOSIS — F10.21 ALCOHOL USE DISORDER, SEVERE, IN SUSTAINED REMISSION: ICD-10-CM

## 2025-02-12 DIAGNOSIS — F33.2 SEVERE EPISODE OF RECURRENT MAJOR DEPRESSIVE DISORDER, WITHOUT PSYCHOTIC FEATURES: Primary | ICD-10-CM

## 2025-02-12 DIAGNOSIS — F41.1 GAD (GENERALIZED ANXIETY DISORDER): ICD-10-CM

## 2025-02-12 RX ORDER — VENLAFAXINE HYDROCHLORIDE 150 MG/1
150 CAPSULE, EXTENDED RELEASE ORAL DAILY
Qty: 30 CAPSULE | Refills: 2 | Status: SHIPPED | OUTPATIENT
Start: 2025-02-12 | End: 2025-05-13

## 2025-02-12 NOTE — PROGRESS NOTES
Subjective   Graciela Galvan is a 59 y.o. female who presents today in follow up for management of major depressive disorder, generalized anxiety disorder, and alcohol use disorder in sustained remission.    Patient reports that she is doing very well overall and endorses sustained improvement in mood, associated restive symptoms, and overall anxiety associated with current medication regimen.  More specifically, patient currently denies a depressed mood and reports improved self worth, improved levels of energy, significantly improved sleep, improved ability to sustain her attention, resolution of psychomotor retardation, and resolution of anhedonia.  Patient also denies experiencing any suicidal ideation, intent or plan.  Patient also reports a significant decrease in her levels of overall anxiety associated with improved ability to control/stop this anxiety and worry when necessary.  She denies any feelings of restlessness/feeling on edge as well as any periods of irritability.  Patient also reports ongoing sobriety from alcohol or any other illegal/illicit substance and denies experiencing any cravings since last visit, stating that she remains highly active in multiple AA groups around [facility].  She reports consistent compliance with prescribed venlafaxine, denies any associated side effects, and appears to be tolerating this medication well.  Patient otherwise denies any auditory, visual, or tactile hallucinations and does not currently appear to be responding to internal stimuli.  Patient denies any generalized paranoia and displays no evidence of delusional thinking.    The following portions of the patient's history were reviewed and updated as appropriate: allergies, current medications, past family history, past medical history, past social history, past surgical history and problem list.       Past Medical History:  Past Medical History:   Diagnosis Date    Alcohol abuse 2009    I am almost 2 years sober     Alcoholism 2009    I am almost 2 years sober    Anxiety     Depression     Fatigue     Hypertension     IBS (irritable bowel syndrome)     Migraine headache     Panic disorder 1970    Pneumonia     PTSD (post-traumatic stress disorder) 1971    Recently found out I qualifiy for this    Rosacea     Self-injurious behavior     Substance abuse        Social History:  Social History     Socioeconomic History    Marital status:    Tobacco Use    Smoking status: Former     Current packs/day: 0.00     Average packs/day: 1.5 packs/day for 20.0 years (30.0 ttl pk-yrs)     Types: Cigarettes     Start date: 1980     Quit date: 2000     Years since quittin.1     Passive exposure: Past    Smokeless tobacco: Never   Vaping Use    Vaping status: Never Used   Substance and Sexual Activity    Alcohol use: Not Currently    Drug use: No    Sexual activity: Yes     Partners: Male     Birth control/protection: Post-menopausal       Family History:  Family History   Problem Relation Age of Onset    Heart disease Mother     Stroke Mother     Alcohol abuse Mother     Heart attack Father     Diabetes Father     Hypertension Father        Past Surgical History:  Past Surgical History:   Procedure Laterality Date    ABDOMINAL SURGERY       SECTION      CHOLECYSTECTOMY      COLONOSCOPY      TOOTH EXTRACTION         Problem List:  Patient Active Problem List   Diagnosis    Acne rosacea    Alcohol use disorder, severe, in sustained remission    Subclinical hypothyroidism    Migraine headache    Gastroesophageal reflux disease    Encounter for screening for malignant neoplasm of colon    Severe episode of recurrent major depressive disorder, without psychotic features    MOISES (generalized anxiety disorder)       Allergy:   Allergies   Allergen Reactions    Erythromycin Anaphylaxis    Butorphanol         Current Medications:   Current Outpatient Medications   Medication Sig Dispense Refill    venlafaxine XR  "(EFFEXOR-XR) 150 MG 24 hr capsule Take 1 capsule by mouth Daily for 90 days. 30 capsule 2    Hydrocortisone, Perianal, (Anusol-HC) 2.5 % rectal cream Insert  into the rectum 2 (Two) Times a Day. 28 g 5    metoprolol succinate XL (TOPROL-XL) 50 MG 24 hr tablet TAKE 1 TABLET BY MOUTH DAILY 90 tablet 1    SUMAtriptan (IMITREX) 100 MG tablet Take 1 tablet by mouth Every 2 (Two) Hours As Needed for Migraine (total of 2 doses daily). 12 tablet 2    traZODone (DESYREL) 100 MG tablet Take 1 tablet  as needed for sleep. 25 tablet 0     No current facility-administered medications for this visit.       Review of Symptoms:    Review of Systems   Constitutional:  Negative for activity change and fatigue.   HENT:  Negative for tinnitus.    Endocrine: Negative for cold intolerance and heat intolerance.   Skin:  Negative for rash.   Neurological:  Negative for seizures and confusion.   Psychiatric/Behavioral:  Positive for depressed mood. Negative for decreased concentration, hallucinations, self-injury, sleep disturbance and suicidal ideas. The patient is not nervous/anxious.          Physical Exam:   Blood pressure 105/68, pulse 75, height 167.6 cm (65.98\"), weight 67 kg (147 lb 12.8 oz), SpO2 92%, not currently breastfeeding.  Appearance: Appears documented age, appropriate hygiene and grooming.  Gait, Station, Strength: Normal gait, station and strength.       Mental Status Exam:   Hygiene:   good  Cooperation:  Cooperative  Eye Contact:  Good  Psychomotor Behavior:  Appropriate  Affect:  Full range and Appropriate  Mood: normal and euthymic  Hopelessness: Denies  Speech:  Normal  Thought Process:  Goal directed and Linear  Thought Content:  Normal  Suicidal:  None  Homicidal:  None  Hallucinations:  None  Delusion:  None  Memory:  Intact  Orientation:  Person, Place, Time, and Situation  Reliability:  good  Insight:  Good  Judgement:  Good  Impulse Control:  Good      Lab Results:   No visits with results within 1 Month(s) " from this visit.   Latest known visit with results is:   Results Encounter on 09/27/2024   Component Date Value Ref Range Status    Cologuard 10/29/2024 Positive (A)  Negative Final    Comment:   POSITIVE TEST RESULT. A positive Cologuard result should be followed with a colonoscopy or visual examination of the colon. The normal value (reference range) for this assay is negative.    TEST DESCRIPTION: Composite algorithmic analysis of stool DNA-biomarkers with hemoglobin immunoassay.   Quantitative values of individual biomarkers are not reportable and are not associated with individual biomarker result reference ranges. Cologuard is intended for colorectal cancer screening of adults of either sex, 45 years or older, who are at average-risk for colorectal cancer (CRC). Cologuard has been approved for use by the U.S. FDA. The performance of Cologuard was established in a cross sectional study of average-risk adults aged 50-84. Cologuard performance in patients ages 45 to 49 years was estimated by sub-group analysis of near-age groups. Colonoscopies performed for a positive result may find as the most clinically significant lesion: colorectal cancer [4.0%], advanced adenoma                            (including sessile serrated polyps greater than or equal to 1cm diameter) [20%] or non- advanced adenoma [31%]; or no colorectal neoplasia [45%]. These estimates are derived from a prospective cross-sectional screening study of 10,000 individuals at average risk for colorectal cancer who were screened with both Cologuard and colonoscopy. (Baudilio Arango al, N Engl J Med 2014;370(14):6994-1659.) Cologuard may produce a false negative or false positive result (no colorectal cancer or precancerous polyp present at colonoscopy follow up). A negative Cologuard test result does not guarantee the absence of CRC or advanced adenoma (pre-cancer). The current Cologuard screening interval is every 3 years. (American Cancer Society  and U.S. Multi-Society Task Force). Cologuard performance data in a 10,000 patient pivotal study using colonoscopy as the reference method can be accessed at the following location: www.[employer]eraView/results. Additional description of the Cologuard test process,                            warnings and precautions can be found at [email or web address].         PHQ-9 Total Score: 4   MOISES-7 Total Score:  1    Assessment & Plan    Diagnoses and all orders for this visit:    1. Severe episode of recurrent major depressive disorder, without psychotic features (Primary)  -     venlafaxine XR (EFFEXOR-XR) 150 MG 24 hr capsule; Take 1 capsule by mouth Daily for 90 days.  Dispense: 30 capsule; Refill: 2    2. MOISES (generalized anxiety disorder)  -     venlafaxine XR (EFFEXOR-XR) 150 MG 24 hr capsule; Take 1 capsule by mouth Daily for 90 days.  Dispense: 30 capsule; Refill: 2    3. Alcohol use disorder, severe, in sustained remission  Overview:  Nida 8/22/2022  She quit 65 days ago!!!!!  Nida 8/16/2023 still sober and doing well             Graciela Galvan is a 59 y.o. female who presents today in follow up for management of major depressive disorder, generalized anxiety disorder, and alcohol use disorder in sustained remission.    As detailed above patient appears to be doing very well overall and currently endorses a significant improvement in depressive and anxious symptoms associated with current medication regimen.  She reports consistent compliance with prescribed venlafaxine, denies any associated side effects, and appears to be tolerating this medication well.  Patient reports feeling more like herself over the past several months and endorses a very noticeable improvement in her psychiatric symptoms when compared to clinical status prior to the initiation of venlafaxine.  Given rather significant benefit associated with this medication in the absence of any significant side effects it was briefly discussed at  today's visit that if the patient were to experience an exacerbation/worsening of any depressive or anxious symptoms warranting pharmacological intervention we will very likely increase her daily dose of venlafaxine to the maximum daily dosage of 225 mg if necessary.  Given near resolution of all depressive and anxious symptoms associated with current dose of venlafaxine I do recommend continuation at 150 mg p.o. once daily at this time.  Patient was also praised for her continued sobriety and advised to remain active in her weekly AA meetings.  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  Otherwise, patient will be seen again in approximately 12 weeks for reassessment.  Patient voices understanding of this and is agreeable to today's plan.    Medications:  -Continue venlafaxine  mg p.o. once daily for management of major depressive disorder and generalized anxiety disorder.      TREATMENT PLAN - SHORT AND LONG-TERM GOALS:   -Continue supportive psychotherapy efforts and medications as indicated. Treatment and medication options discussed during today's visit.   -Patient acknowledged and verbally consented to continue with current treatment plan and was educated on the importance of compliance with treatment and follow-up appointments.    SUMMARY/EDUCATION/DISCUSSION:  -Pt was given appropriate time to ask questions and concerns were addressed. A thorough discussion was had that included review of disease process, need for continued monitoring and additional treatment options including use of pharmacological and non-pharmacological approaches to care, decisions were made and agreed upon by patient and provider.   -Discussed medication options and treatment plan of prescribed medication as well as the risks, benefits, and side effects including potential falls, possible impaired  driving and metabolic adversities among others; patient acknowledged and provided verbal consent.   -Patient has been educated regarding multimodal approach with healthy nutrition, healthy sleep, regular physical activity, social activities, counseling, and medications.  -Please call the office at [phone] within normal business hours (Monday-Friday, 8:00 AM - 4:30 PM) with any worsening of symptoms or onset of intolerable side effects. Please ask to leave a message with office staff.  Please allow up to 24-48 hours for response to a patient call/question/refill request.  -Safety plan has been established and discussed in detail with the patient, who is agreeable to contact support system and/or call 911 or go to the nearest ER should he/she/they have any thoughts of harm to self or others.    MEDS ORDERED DURING VISIT:  New Medications Ordered This Visit   Medications    venlafaxine XR (EFFEXOR-XR) 150 MG 24 hr capsule     Sig: Take 1 capsule by mouth Daily for 90 days.     Dispense:  30 capsule     Refill:  2       FOLLOW UP:  Return in about 12 weeks (around 5/7/2025) for Next scheduled follow up.      Everton Newell DO    This document has been electronically signed by Everton Newell DO  February 12, 2025 15:23 EST    Part of this note may be an electronic transcription/translation of spoken language to printed text using the Dragon Dictation System. Some of the data in this electronic note has been brought forward from a previous encounter, any necessary changes have been made, it has been reviewed by this provider, and it is accurate.    Answers submitted by the patient for this visit:  Depression (Submitted on 2/8/2025)  Chief Complaint: Depression  Visit: follow-up  Frequency: most days  Severity: moderate  excessive worry: No  insomnia: Yes  irritability: No  malaise/fatigue: No  obsessions: No  hypersomnia: Yes  difficulty controlling mood: No  difficulty staying asleep: Yes  difficulty  falling asleep: Yes  Hours of sleep per night: 4 Hours  Medication compliance: %  Aggravated by: nothing

## 2025-02-25 DIAGNOSIS — F33.2 SEVERE EPISODE OF RECURRENT MAJOR DEPRESSIVE DISORDER, WITHOUT PSYCHOTIC FEATURES: ICD-10-CM

## 2025-02-25 DIAGNOSIS — F41.1 GAD (GENERALIZED ANXIETY DISORDER): ICD-10-CM

## 2025-04-14 DIAGNOSIS — I10 BENIGN ESSENTIAL HYPERTENSION: ICD-10-CM

## 2025-04-15 RX ORDER — METOPROLOL SUCCINATE 50 MG/1
50 TABLET, EXTENDED RELEASE ORAL DAILY
Qty: 90 TABLET | Refills: 1 | Status: SHIPPED | OUTPATIENT
Start: 2025-04-15

## 2025-05-07 ENCOUNTER — OFFICE VISIT (OUTPATIENT)
Age: 60
End: 2025-05-07
Payer: COMMERCIAL

## 2025-05-07 VITALS
OXYGEN SATURATION: 97 % | BODY MASS INDEX: 25.88 KG/M2 | HEIGHT: 66 IN | WEIGHT: 161 LBS | SYSTOLIC BLOOD PRESSURE: 125 MMHG | HEART RATE: 87 BPM | DIASTOLIC BLOOD PRESSURE: 73 MMHG

## 2025-05-07 DIAGNOSIS — F10.21 ALCOHOL USE DISORDER, SEVERE, IN SUSTAINED REMISSION: ICD-10-CM

## 2025-05-07 DIAGNOSIS — F41.1 GAD (GENERALIZED ANXIETY DISORDER): ICD-10-CM

## 2025-05-07 DIAGNOSIS — F33.2 SEVERE EPISODE OF RECURRENT MAJOR DEPRESSIVE DISORDER, WITHOUT PSYCHOTIC FEATURES: Primary | ICD-10-CM

## 2025-05-07 RX ORDER — VENLAFAXINE HYDROCHLORIDE 150 MG/1
150 CAPSULE, EXTENDED RELEASE ORAL DAILY
Qty: 30 CAPSULE | Refills: 2 | Status: SHIPPED | OUTPATIENT
Start: 2025-05-07 | End: 2025-08-05

## 2025-05-07 RX ORDER — VENLAFAXINE HYDROCHLORIDE 150 MG/1
150 CAPSULE, EXTENDED RELEASE ORAL DAILY
Qty: 30 CAPSULE | Refills: 2 | OUTPATIENT
Start: 2025-05-07

## 2025-05-07 NOTE — PROGRESS NOTES
Subjective   Graciela Galvan is a 59 y.o. female who presents today in follow up for management of major depressive disorder, generalized anxiety disorder and alcohol use disorder in sustained remission.    Patient reports that she is doing well overall and once again endorses ongoing improvement in psychiatric symptoms associated with current medication regimen.  More specifically, patient reports sustained improvement in mood associated with improved self worth, significantly improved sleep, improved levels of energy, resolution of psychomotor retardation, and decreased feelings of guilt.  Patient currently denies any active suicidal ideation, intent or plan as well as any anhedonia.  She does report experiencing a depressed mood associated with an exacerbation of her depressive symptoms this past week for approximately 2 to 3 days, but does report that her aunt did recently passed away at the age of 96 and that this brief period of depressive symptoms with experienced shortly after the .  Patient does feel as if she is processing through this grief appropriately at this time and remains active in outpatient psychotherapy with established therapist, Medina Hopkins, with her next appointment scheduled for this afternoon.  Patient does also report decreased levels of anxiety overall associated with improved ability to control/stop her anxiety and worry.  She denies catastrophizing or thinking of worst case scenarios as well as any irritability or difficulties winding down/relaxing.  She also reports ongoing abstinence from alcohol and remains highly active in AA. Patient otherwise denies any auditory, visual, or tactile hallucinations and does not currently appear to be responding to internal stimuli.  Patient denies any generalized paranoia and displays no evidence of delusional thinking.    The following portions of the patient's history were reviewed and updated as appropriate: allergies, current  medications, past family history, past medical history, past social history, past surgical history and problem list.  History of Present Illness               Past Medical History:  Past Medical History:   Diagnosis Date    Alcohol abuse     I am almost 2 years sober    Alcoholism     I am almost 2 years sober    Anxiety     Depression     Fatigue     Hypertension     IBS (irritable bowel syndrome)     Migraine headache     Panic disorder 1970    Pneumonia     PTSD (post-traumatic stress disorder) 1971    Recently found out I qualifiy for this    Rosacea     Self-injurious behavior     Substance abuse        Social History:  Social History     Socioeconomic History    Marital status:    Tobacco Use    Smoking status: Former     Current packs/day: 0.00     Average packs/day: 1.5 packs/day for 20.0 years (30.0 ttl pk-yrs)     Types: Cigarettes     Start date: 1980     Quit date: 2000     Years since quittin.3     Passive exposure: Past    Smokeless tobacco: Never   Vaping Use    Vaping status: Never Used   Substance and Sexual Activity    Alcohol use: Not Currently    Drug use: No    Sexual activity: Yes     Partners: Male     Birth control/protection: Post-menopausal       Family History:  Family History   Problem Relation Age of Onset    Heart disease Mother     Stroke Mother     Alcohol abuse Mother     Heart attack Father     Diabetes Father     Hypertension Father        Past Surgical History:  Past Surgical History:   Procedure Laterality Date    ABDOMINAL SURGERY       SECTION      CHOLECYSTECTOMY      COLONOSCOPY      TOOTH EXTRACTION         Problem List:  Patient Active Problem List   Diagnosis    Acne rosacea    Alcohol use disorder, severe, in sustained remission    Subclinical hypothyroidism    Migraine headache    Gastroesophageal reflux disease    Encounter for screening for malignant neoplasm of colon    Severe episode of recurrent major depressive disorder,  "without psychotic features    MOISES (generalized anxiety disorder)       Allergy:   Allergies   Allergen Reactions    Erythromycin Anaphylaxis    Butorphanol         Current Medications:   Current Outpatient Medications   Medication Sig Dispense Refill    venlafaxine XR (EFFEXOR-XR) 150 MG 24 hr capsule Take 1 capsule by mouth Daily for 90 days. 30 capsule 2    Hydrocortisone, Perianal, (Anusol-HC) 2.5 % rectal cream Insert  into the rectum 2 (Two) Times a Day. 28 g 5    metoprolol succinate XL (TOPROL-XL) 50 MG 24 hr tablet TAKE 1 TABLET BY MOUTH DAILY 90 tablet 1    SUMAtriptan (IMITREX) 100 MG tablet Take 1 tablet by mouth Every 2 (Two) Hours As Needed for Migraine (total of 2 doses daily). 12 tablet 2    traZODone (DESYREL) 100 MG tablet Take 1 tablet  as needed for sleep. 25 tablet 0     No current facility-administered medications for this visit.       Review of Symptoms:    Review of Systems   Constitutional:  Positive for fatigue. Negative for activity change.   HENT:  Negative for tinnitus.    Eyes:  Negative for visual disturbance.   Endocrine: Negative for cold intolerance and heat intolerance.   Skin:  Negative for rash.   Neurological:  Negative for dizziness, seizures and confusion.   Psychiatric/Behavioral:  Positive for depressed mood and stress. Negative for decreased concentration, hallucinations, self-injury, sleep disturbance and suicidal ideas. The patient is nervous/anxious.          Physical Exam:   Blood pressure 125/73, pulse 87, height 167.6 cm (65.98\"), weight 73 kg (161 lb), SpO2 97%, not currently breastfeeding.  Appearance: He is documented age, appropriate hygiene and grooming.  Gait, Station, Strength: Normal gait, station and strength.  Physical Exam               Mental Status Exam:   Hygiene:   good  Cooperation:  Cooperative  Eye Contact:  Good  Psychomotor Behavior:  Appropriate  Affect:  Full range and Appropriate  Mood: normal and euthymic  Hopelessness: Denies  Speech:  " Normal  Thought Process:  Goal directed and Linear  Thought Content:  Normal  Suicidal:  None  Homicidal:  None  Hallucinations:  None  Delusion:  None  Memory:  Intact  Orientation:  Person, Place, Time, and Situation  Reliability:  good  Insight:  Good  Judgement:  Good  Impulse Control:  Good      Lab Results:   No visits with results within 1 Month(s) from this visit.   Latest known visit with results is:   Results Encounter on 09/27/2024   Component Date Value Ref Range Status    Cologuard 10/29/2024 Positive (A)  Negative Final    Comment:   POSITIVE TEST RESULT. A positive Cologuard result should be followed with a colonoscopy or visual examination of the colon. The normal value (reference range) for this assay is negative.    TEST DESCRIPTION: Composite algorithmic analysis of stool DNA-biomarkers with hemoglobin immunoassay.   Quantitative values of individual biomarkers are not reportable and are not associated with individual biomarker result reference ranges. Cologuard is intended for colorectal cancer screening of adults of either sex, 45 years or older, who are at average-risk for colorectal cancer (CRC). Cologuard has been approved for use by the U.S. FDA. The performance of Cologuard was established in a cross sectional study of average-risk adults aged 50-84. Cologuard performance in patients ages 45 to 49 years was estimated by sub-group analysis of near-age groups. Colonoscopies performed for a positive result may find as the most clinically significant lesion: colorectal cancer [4.0%], advanced adenoma                            (including sessile serrated polyps greater than or equal to 1cm diameter) [20%] or non- advanced adenoma [31%]; or no colorectal neoplasia [45%]. These estimates are derived from a prospective cross-sectional screening study of 10,000 individuals at average risk for colorectal cancer who were screened with both Cologuard and colonoscopy. (Baudilio Santos, N Engl J Med  2014;370(73):5791-3451.) Cologuard may produce a false negative or false positive result (no colorectal cancer or precancerous polyp present at colonoscopy follow up). A negative Cologuard test result does not guarantee the absence of CRC or advanced adenoma (pre-cancer). The current Cologuard screening interval is every 3 years. (American Cancer Society and U.S. Multi-Society Task Force). Cologuard performance data in a 10,000 patient pivotal study using colonoscopy as the reference method can be accessed at the following location: www.[facility]/results. Additional description of the Cologuard test process,                            warnings and precautions can be found at www.[employer].       Results            PHQ-9 Total Score: 8    MOISES-7 Total Score: 1     Assessment & Plan    Diagnoses and all orders for this visit:    1. Severe episode of recurrent major depressive disorder, without psychotic features (Primary)  -     venlafaxine XR (EFFEXOR-XR) 150 MG 24 hr capsule; Take 1 capsule by mouth Daily for 90 days.  Dispense: 30 capsule; Refill: 2    2. MOISES (generalized anxiety disorder)  -     venlafaxine XR (EFFEXOR-XR) 150 MG 24 hr capsule; Take 1 capsule by mouth Daily for 90 days.  Dispense: 30 capsule; Refill: 2    3. Alcohol use disorder, severe, in sustained remission  Overview:  Nida 8/22/2022  She quit 65 days ago!!!!!  Nida 8/16/2023 still sober and doing well             Graciela Galvan is a 59 y.o. female who presents today in follow up for management of major depressive disorder, generalized anxiety disorder and alcohol use disorder in sustained remission.    As detailed above patient appears to be doing very well and once again endorses ongoing improvement in psychiatric symptoms when compared to clinical status several months ago.  She reports consistent compliance with prescribed venlafaxine, denies any associated side effects and appears to be tolerating this medication well.   She once again endorses a rather significant improvement in numerous depressive and anxious symptoms as evidenced by current PHQ-9 and MOISES-7 scores.  Patient was praised for her commitment to recommended treatment plan as well as to her ongoing sobriety as the patient remains highly active in AA and denies experiencing any recent cravings for alcohol despite recent psychosocial stressors as detailed above.  She also remains active in psychotherapy and is currently attempting to work through various childhood traumas with her established therapist, Medina Hopkins.  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  Overall, I recommend continuing current dosage of venlafaxine XR at this time given rather significant therapeutic benefit associated with this medication in the absence of any significant side effects.  Patient was also encouraged to continue routine psychotherapy and to remain active in AA,.  Otherwise, she will be seen again here in the office in approximately 12 weeks for reassessment.  Patient voices understanding of this and is agreeable to today's plan.    Medications:  -Continue venlafaxine  mg p.o. once daily for management of major depressive disorder and generalized anxiety disorder.    TREATMENT PLAN - SHORT AND LONG-TERM GOALS:   -Continue supportive psychotherapy efforts and medications as indicated. Treatment and medication options discussed during today's visit.   -Patient acknowledged and verbally consented to continue with current treatment plan and was educated on the importance of compliance with treatment and follow-up appointments.    SUMMARY/EDUCATION/DISCUSSION:  -Pt was given appropriate time to ask questions and concerns were addressed. BRANDON  thorough discussion was had that included review of disease process, need for continued monitoring and additional treatment options including use of pharmacological and non-pharmacological approaches to care, decisions were made and agreed upon by patient and provider.   -Discussed medication options and treatment plan of prescribed medication as well as the risks, benefits, and side effects including potential falls, possible impaired driving and metabolic adversities among others; patient acknowledged and provided verbal consent.   -Patient has been educated regarding multimodal approach with healthy nutrition, healthy sleep, regular physical activity, social activities, counseling, and medications.  -Please call the office at [phone] within normal business hours (Monday-Friday, 8:00 AM - 4:30 PM) with any worsening of symptoms or onset of intolerable side effects. Please ask to leave a message with office staff.  Please allow up to 24-48 hours for response to a patient call/question/refill request.  -Safety plan has been established and discussed in detail with the patient, who is agreeable to contact support system and/or call 911 or go to the nearest ER should he/she/they have any thoughts of harm to self or others.    MEDS ORDERED DURING VISIT:  New Medications Ordered This Visit   Medications    venlafaxine XR (EFFEXOR-XR) 150 MG 24 hr capsule     Sig: Take 1 capsule by mouth Daily for 90 days.     Dispense:  30 capsule     Refill:  2       FOLLOW UP:  Return in about 12 weeks (around 7/30/2025) for Next scheduled follow up.      Everton Newell DO    This document has been electronically signed by Everton Newell DO  May 7, 2025 13:34 EDT    Part of this note may be an electronic transcription/translation of spoken language to printed text using the Dragon Dictation System. Some of the data in this electronic note has been brought forward from a previous encounter, any necessary changes have  been made, it has been reviewed by this provider, and it is accurate.    Answers submitted by the patient for this visit:  Depression (Submitted on 5/6/2025)  Chief Complaint: Depression  Visit: follow-up  Frequency: most days  Severity: moderate  excessive worry: Yes  insomnia: Yes  irritability: Yes  malaise/fatigue: Yes  obsessions: Yes  hypersomnia: Yes  difficulty controlling mood: Yes  difficulty staying asleep: Yes  difficulty falling asleep: Yes  Hours of sleep per night: 5 Hours  Medication compliance: %  Aggravated by: nothing

## 2025-06-09 DIAGNOSIS — F33.2 SEVERE EPISODE OF RECURRENT MAJOR DEPRESSIVE DISORDER, WITHOUT PSYCHOTIC FEATURES: ICD-10-CM

## 2025-06-09 DIAGNOSIS — F41.1 GAD (GENERALIZED ANXIETY DISORDER): ICD-10-CM

## 2025-06-10 RX ORDER — VENLAFAXINE HYDROCHLORIDE 150 MG/1
150 CAPSULE, EXTENDED RELEASE ORAL DAILY
Qty: 30 CAPSULE | Refills: 2 | OUTPATIENT
Start: 2025-06-10 | End: 2025-09-08

## 2025-06-10 NOTE — TELEPHONE ENCOUNTER
Rx Refill Note  Requested Prescriptions     Pending Prescriptions Disp Refills    venlafaxine XR (EFFEXOR-XR) 150 MG 24 hr capsule [Pharmacy Med Name: VENLAFAXINE HCL  MG CAP] 30 capsule 2     Sig: TAKE 1 CAPSULE BY MOUTH DAILY FOR 90 DAYS      Last office visit with prescribing clinician: 5/7/2025   Last telemedicine visit with prescribing clinician: Visit date not found   Next office visit with prescribing clinician: 7/30/2025     Caron Crotez MA  06/10/25, 10:12 EDT

## 2025-06-10 NOTE — TELEPHONE ENCOUNTER
Prescription recently sent in by Dr. Newell on 5/7/2025.  Patient should have enough medication till August.

## 2025-06-23 ENCOUNTER — HOSPITAL ENCOUNTER (OUTPATIENT)
Dept: GENERAL RADIOLOGY | Facility: HOSPITAL | Age: 60
Discharge: HOME | End: 2025-06-23
Admitting: NURSE PRACTITIONER
Payer: COMMERCIAL

## 2025-06-23 ENCOUNTER — OFFICE VISIT (OUTPATIENT)
Dept: FAMILY MEDICINE CLINIC | Facility: CLINIC | Age: 60
End: 2025-06-23
Payer: COMMERCIAL

## 2025-06-23 VITALS
WEIGHT: 156 LBS | DIASTOLIC BLOOD PRESSURE: 74 MMHG | SYSTOLIC BLOOD PRESSURE: 122 MMHG | HEART RATE: 81 BPM | BODY MASS INDEX: 25.07 KG/M2 | HEIGHT: 66 IN | RESPIRATION RATE: 18 BRPM | OXYGEN SATURATION: 96 %

## 2025-06-23 DIAGNOSIS — R07.81 RIB PAIN ON LEFT SIDE: Primary | ICD-10-CM

## 2025-06-23 DIAGNOSIS — R07.81 RIB PAIN ON LEFT SIDE: ICD-10-CM

## 2025-06-23 PROBLEM — R03.0 FINDING OF ABOVE NORMAL BLOOD PRESSURE: Status: ACTIVE | Noted: 2024-01-22

## 2025-06-23 PROBLEM — Z87.09 HISTORY OF HAY FEVER: Status: ACTIVE | Noted: 2024-01-22

## 2025-06-23 PROBLEM — L70.8 OTHER ACNE: Status: ACTIVE | Noted: 2017-10-17

## 2025-06-23 PROBLEM — L72.0 EPIDERMOID CYST OF SKIN: Status: ACTIVE | Noted: 2024-01-22

## 2025-06-23 PROBLEM — Z86.69 HISTORY OF MIGRAINE: Status: ACTIVE | Noted: 2024-01-22

## 2025-06-23 PROCEDURE — 71046 X-RAY EXAM CHEST 2 VIEWS: CPT

## 2025-06-23 PROCEDURE — 99213 OFFICE O/P EST LOW 20 MIN: CPT | Performed by: NURSE PRACTITIONER

## 2025-06-23 NOTE — PROGRESS NOTES
Subjective   Graciela Galvan is a 59 y.o. female.     Chief Complaint   Patient presents with    Chest Pain     Rib pain on L side after vomiting episode last Tuesday, pt tried ice yesterday without relief        History of Present Illness     History of Present Illness  The patient presents for evaluation of rib pain.    She experienced an episode of vomiting on 06/17/2025, which has since ceased. However, she has been experiencing rib pain that has progressively worsened, extending to the upper part of her muscles and causing difficulty in full inhalation. The pain intensifies with hiccups, coughing, or sneezing. She reports no known history of osteoporosis or osteopenia. She also mentions recent stress, which she believes may have triggered the vomiting episode. The pain is now radiating towards her back. She sought medical attention at an urgent care facility on 6/202/2025 where she was prescribed prednisone and ibuprofen 800 mg. Despite this, she reports that the pain returns before the next scheduled dose of medication. A chest x-ray was not performed during her visit to the urgent care.       The following portions of the patient's history were reviewed and updated as appropriate: allergies, current medications, past family history, past medical history, past social history, past surgical history and problem list.    Review of Systems   Constitutional:  Negative for chills, fatigue and fever.   Respiratory:  Negative for cough, chest tightness, shortness of breath and wheezing.         Rib pain   Cardiovascular:  Negative for chest pain, palpitations and leg swelling.   Neurological:  Negative for dizziness and headache.   Hematological: Negative.    Psychiatric/Behavioral: Negative.  Negative for sleep disturbance.        Objective   Physical Exam  Vitals and nursing note reviewed.   Constitutional:       Appearance: Normal appearance. She is well-developed.   HENT:      Head: Normocephalic and  atraumatic.   Eyes:      Conjunctiva/sclera: Conjunctivae normal.      Pupils: Pupils are equal, round, and reactive to light.   Cardiovascular:      Rate and Rhythm: Normal rate and regular rhythm.      Heart sounds: Normal heart sounds. No murmur heard.  Pulmonary:      Effort: Pulmonary effort is normal.      Breath sounds: Normal breath sounds.   Chest:      Chest wall: Tenderness present.          Comments: Tenderness in left rib area with palpation. No bruising, redness or swelling noted.   Neurological:      Mental Status: She is alert and oriented to person, place, and time.   Psychiatric:         Behavior: Behavior normal.         Thought Content: Thought content normal.         Judgment: Judgment normal.         Vitals:    06/23/25 1027   BP: 122/74   Pulse: 81   Resp: 18   SpO2: 96%     Body mass index is 25.19 kg/m².      Procedures    Assessment & Plan   Problems Addressed this Visit    None  Visit Diagnoses         Rib pain on left side    -  Primary    Relevant Orders    XR Chest PA & Lateral          Diagnoses         Codes Comments      Rib pain on left side    -  Primary ICD-10-CM: R07.81  ICD-9-CM: 786.50             Assessment & Plan  1. Rib pain.  - Reports rib pain following an episode of vomiting last Tuesday, with pain exacerbated by deep breaths, hiccups, coughs, and sneezes.  - Oxygen saturation levels are within normal range.  - Discussed the need for a chest x-ray to rule out any underlying issues.  - Advised to continue prednisone and ibuprofen; may take Tylenol for additional pain management. A work note will be provided upon request.              Return if symptoms worsen or fail to improve.    Patient or patient representative verbalized consent for the use of Ambient Listening during the visit with  JORDYN Edge for chart documentation. 6/23/2025  10:45 EDT

## 2025-06-24 ENCOUNTER — TELEPHONE (OUTPATIENT)
Dept: FAMILY MEDICINE CLINIC | Facility: CLINIC | Age: 60
End: 2025-06-24
Payer: COMMERCIAL

## 2025-06-24 DIAGNOSIS — R91.8 LUNG MASS: Primary | ICD-10-CM

## 2025-06-24 NOTE — TELEPHONE ENCOUNTER
Received call from Dr. Vega regarding results of patient's chest x-ray. She has mass in left lung that radiology is recommending further studies with CT w/contrast.  I called patient and discussed results with her. She verbalized understanding.

## 2025-06-27 ENCOUNTER — TELEPHONE (OUTPATIENT)
Dept: FAMILY MEDICINE CLINIC | Facility: CLINIC | Age: 60
End: 2025-06-27

## 2025-06-27 NOTE — TELEPHONE ENCOUNTER
Caller: Graciela Galvan    Relationship: Self    Best call back number: [phone]    What medication are you requesting: PREDNISONE 20 MG TABLETS - ONE TABLET TWO TIMES A DAY.     What are your current symptoms: INFLAMMATION     How long have you been experiencing symptoms: 6.23.25    Have you had these symptoms before:    [x] Yes  [] No    Have you been treated for these symptoms before:   [x] Yes  [] No    If a prescription is needed, what is your preferred pharmacy and phone number: [facility] [id number] - [facility] [address] [facility] AT [facility] [address] [facility] [phone] [facility] [phone]      Additional notes:    PATIENT STATES SHE SPOKE WITH KARMA COBB CASKEY ABOUT THIS MEDICATION WHEN SHE SAW HER ON 6.23.25.     PLEASE CALL TO CONFIRM OR DENY.

## 2025-06-28 ENCOUNTER — HOSPITAL ENCOUNTER (EMERGENCY)
Facility: HOSPITAL | Age: 60
Discharge: HOME | End: 2025-06-28
Attending: EMERGENCY MEDICINE
Payer: COMMERCIAL

## 2025-06-28 ENCOUNTER — APPOINTMENT (OUTPATIENT)
Dept: CT IMAGING | Facility: HOSPITAL | Age: 60
End: 2025-06-28
Payer: COMMERCIAL

## 2025-06-28 VITALS
DIASTOLIC BLOOD PRESSURE: 59 MMHG | TEMPERATURE: 100 F | HEIGHT: 67 IN | SYSTOLIC BLOOD PRESSURE: 114 MMHG | WEIGHT: 158 LBS | OXYGEN SATURATION: 95 % | HEART RATE: 74 BPM | BODY MASS INDEX: 24.8 KG/M2 | RESPIRATION RATE: 17 BRPM

## 2025-06-28 DIAGNOSIS — J18.9 PNEUMONIA OF LEFT LUNG DUE TO INFECTIOUS ORGANISM, UNSPECIFIED PART OF LUNG: ICD-10-CM

## 2025-06-28 DIAGNOSIS — R07.9 CHEST PAIN AT REST: ICD-10-CM

## 2025-06-28 DIAGNOSIS — R06.00 DYSPNEA, UNSPECIFIED TYPE: ICD-10-CM

## 2025-06-28 DIAGNOSIS — R91.8 MASS OF LINGULA OF LUNG: Primary | ICD-10-CM

## 2025-06-28 LAB
ALBUMIN SERPL-MCNC: 3.4 G/DL (ref 3.5–5.2)
ALBUMIN/GLOB SERPL: 0.8 G/DL
ALP SERPL-CCNC: 78 U/L (ref 39–117)
ALT SERPL W P-5'-P-CCNC: 34 U/L (ref 1–33)
ANION GAP SERPL CALCULATED.3IONS-SCNC: 12.2 MMOL/L (ref 5–15)
AST SERPL-CCNC: 15 U/L (ref 1–32)
BASOPHILS # BLD AUTO: 0.03 10*3/MM3 (ref 0–0.2)
BASOPHILS NFR BLD AUTO: 0.2 % (ref 0–1.5)
BILIRUB SERPL-MCNC: 0.5 MG/DL (ref 0–1.2)
BUN SERPL-MCNC: 11.5 MG/DL (ref 6–20)
BUN/CREAT SERPL: 13.9 (ref 7–25)
CALCIUM SPEC-SCNC: 10.8 MG/DL (ref 8.6–10.5)
CHLORIDE SERPL-SCNC: 101 MMOL/L (ref 98–107)
CO2 SERPL-SCNC: 20.8 MMOL/L (ref 22–29)
CREAT SERPL-MCNC: 0.83 MG/DL (ref 0.57–1)
D DIMER PPP FEU-MCNC: 1.13 MCGFEU/ML (ref 0–0.59)
D-LACTATE SERPL-SCNC: 1.7 MMOL/L (ref 0.5–2)
DEPRECATED RDW RBC AUTO: 39 FL (ref 37–54)
EGFRCR SERPLBLD CKD-EPI 2021: 81.3 ML/MIN/1.73
EOSINOPHIL # BLD AUTO: 0.29 10*3/MM3 (ref 0–0.4)
EOSINOPHIL NFR BLD AUTO: 1.9 % (ref 0.3–6.2)
ERYTHROCYTE [DISTWIDTH] IN BLOOD BY AUTOMATED COUNT: 11.9 % (ref 12.3–15.4)
GEN 5 1HR TROPONIN T REFLEX: <6 NG/L
GLOBULIN UR ELPH-MCNC: 4.2 GM/DL
GLUCOSE SERPL-MCNC: 174 MG/DL (ref 65–99)
HCT VFR BLD AUTO: 37.5 % (ref 34–46.6)
HGB BLD-MCNC: 12.4 G/DL (ref 12–15.9)
HOLD SPECIMEN: NORMAL
HOLD SPECIMEN: NORMAL
IMM GRANULOCYTES # BLD AUTO: 0.2 10*3/MM3 (ref 0–0.05)
IMM GRANULOCYTES NFR BLD AUTO: 1.3 % (ref 0–0.5)
LYMPHOCYTES # BLD AUTO: 1.21 10*3/MM3 (ref 0.7–3.1)
LYMPHOCYTES NFR BLD AUTO: 7.8 % (ref 19.6–45.3)
MCH RBC QN AUTO: 29.5 PG (ref 26.6–33)
MCHC RBC AUTO-ENTMCNC: 33.1 G/DL (ref 31.5–35.7)
MCV RBC AUTO: 89.3 FL (ref 79–97)
MONOCYTES # BLD AUTO: 1.12 10*3/MM3 (ref 0.1–0.9)
MONOCYTES NFR BLD AUTO: 7.2 % (ref 5–12)
NEUTROPHILS NFR BLD AUTO: 12.74 10*3/MM3 (ref 1.7–7)
NEUTROPHILS NFR BLD AUTO: 81.6 % (ref 42.7–76)
PLATELET # BLD AUTO: 434 10*3/MM3 (ref 140–450)
PMV BLD AUTO: 8.9 FL (ref 6–12)
POTASSIUM SERPL-SCNC: 3.8 MMOL/L (ref 3.5–5.2)
PROT SERPL-MCNC: 7.6 G/DL (ref 6–8.5)
QT INTERVAL: 314 MS
QTC INTERVAL: 399 MS
RBC # BLD AUTO: 4.2 10*6/MM3 (ref 3.77–5.28)
SODIUM SERPL-SCNC: 134 MMOL/L (ref 136–145)
TROPONIN T NUMERIC DELTA: NORMAL
TROPONIN T SERPL HS-MCNC: <6 NG/L
WBC NRBC COR # BLD AUTO: 15.59 10*3/MM3 (ref 3.4–10.8)
WHOLE BLOOD HOLD COAG: NORMAL
WHOLE BLOOD HOLD SPECIMEN: NORMAL

## 2025-06-28 PROCEDURE — 84484 ASSAY OF TROPONIN QUANT: CPT | Performed by: EMERGENCY MEDICINE

## 2025-06-28 PROCEDURE — 25810000003 SODIUM CHLORIDE 0.9 % SOLUTION: Performed by: EMERGENCY MEDICINE

## 2025-06-28 PROCEDURE — 99285 EMERGENCY DEPT VISIT HI MDM: CPT

## 2025-06-28 PROCEDURE — 96365 THER/PROPH/DIAG IV INF INIT: CPT

## 2025-06-28 PROCEDURE — 36415 COLL VENOUS BLD VENIPUNCTURE: CPT

## 2025-06-28 PROCEDURE — 83605 ASSAY OF LACTIC ACID: CPT | Performed by: EMERGENCY MEDICINE

## 2025-06-28 PROCEDURE — 93005 ELECTROCARDIOGRAM TRACING: CPT | Performed by: EMERGENCY MEDICINE

## 2025-06-28 PROCEDURE — 25510000001 IOPAMIDOL PER 1 ML: Performed by: EMERGENCY MEDICINE

## 2025-06-28 PROCEDURE — 71275 CT ANGIOGRAPHY CHEST: CPT

## 2025-06-28 PROCEDURE — 96375 TX/PRO/DX INJ NEW DRUG ADDON: CPT

## 2025-06-28 PROCEDURE — 85379 FIBRIN DEGRADATION QUANT: CPT | Performed by: EMERGENCY MEDICINE

## 2025-06-28 PROCEDURE — 25010000002 CEFTRIAXONE PER 250 MG: Performed by: EMERGENCY MEDICINE

## 2025-06-28 PROCEDURE — 85025 COMPLETE CBC W/AUTO DIFF WBC: CPT | Performed by: EMERGENCY MEDICINE

## 2025-06-28 PROCEDURE — 25010000002 HYDROMORPHONE PER 4 MG: Performed by: EMERGENCY MEDICINE

## 2025-06-28 PROCEDURE — 80053 COMPREHEN METABOLIC PANEL: CPT | Performed by: EMERGENCY MEDICINE

## 2025-06-28 PROCEDURE — 96361 HYDRATE IV INFUSION ADD-ON: CPT

## 2025-06-28 RX ORDER — DOXYCYCLINE 100 MG/1
100 CAPSULE ORAL 2 TIMES DAILY
Qty: 14 CAPSULE | Refills: 0 | Status: SHIPPED | OUTPATIENT
Start: 2025-06-28 | End: 2025-07-05

## 2025-06-28 RX ORDER — ONDANSETRON 4 MG/1
4 TABLET, ORALLY DISINTEGRATING ORAL EVERY 6 HOURS PRN
Qty: 15 TABLET | Refills: 0 | Status: SHIPPED | OUTPATIENT
Start: 2025-06-28

## 2025-06-28 RX ORDER — HYDROMORPHONE HYDROCHLORIDE 1 MG/ML
0.5 INJECTION, SOLUTION INTRAMUSCULAR; INTRAVENOUS; SUBCUTANEOUS ONCE
Refills: 0 | Status: COMPLETED | OUTPATIENT
Start: 2025-06-28 | End: 2025-06-28

## 2025-06-28 RX ORDER — CEFPODOXIME PROXETIL 200 MG/1
400 TABLET, FILM COATED ORAL 2 TIMES DAILY
Qty: 28 TABLET | Refills: 0 | Status: SHIPPED | OUTPATIENT
Start: 2025-06-28 | End: 2025-07-05

## 2025-06-28 RX ORDER — KETOROLAC TROMETHAMINE 15 MG/ML
15 INJECTION, SOLUTION INTRAMUSCULAR; INTRAVENOUS ONCE
Status: DISCONTINUED | OUTPATIENT
Start: 2025-06-28 | End: 2025-06-28

## 2025-06-28 RX ORDER — IOPAMIDOL 755 MG/ML
100 INJECTION, SOLUTION INTRAVASCULAR
Status: COMPLETED | OUTPATIENT
Start: 2025-06-28 | End: 2025-06-28

## 2025-06-28 RX ORDER — HYDROCODONE BITARTRATE AND ACETAMINOPHEN 5; 325 MG/1; MG/1
1 TABLET ORAL EVERY 6 HOURS PRN
Qty: 15 TABLET | Refills: 0 | Status: SHIPPED | OUTPATIENT
Start: 2025-06-28

## 2025-06-28 RX ADMIN — IOPAMIDOL 100 ML: 755 INJECTION, SOLUTION INTRAVENOUS at 14:49

## 2025-06-28 RX ADMIN — CEFTRIAXONE SODIUM 1000 MG: 1 INJECTION, POWDER, FOR SOLUTION INTRAMUSCULAR; INTRAVENOUS at 15:41

## 2025-06-28 RX ADMIN — SODIUM CHLORIDE 1000 ML: 9 INJECTION, SOLUTION INTRAVENOUS at 14:40

## 2025-06-28 RX ADMIN — HYDROMORPHONE HYDROCHLORIDE 0.5 MG: 1 INJECTION, SOLUTION INTRAMUSCULAR; INTRAVENOUS; SUBCUTANEOUS at 14:28

## 2025-06-28 NOTE — FSED PROVIDER NOTE
Subjective   History of Present Illness  59-year-old female who started having left lateral chest pain was seen on 2025 and diagnosed by chest x-ray with a mass.  Scheduled for CT however the pain is getting worse and she is more uncomfortable and she feels short of breath when she walks across the room.  Cannot get comfortable. No headache or stiff or sore neck, no rash, no change in vision or mental function, no dizziness, no back pain or abdominal pain. No fever. No nausea or vomiting, no malaise or weakness, no body aches.  She has not been drinking a lot of fluid and feels dehydrated.  The location of pain has not changed.  She has no soreness in her thighs or calf.        Review of Systems    Past Medical History:   Diagnosis Date    Alcohol abuse     I am almost 2 years sober    Alcoholism     I am almost 2 years sober    Anxiety     Depression     Fatigue     Hypertension     IBS (irritable bowel syndrome)     Migraine headache     Panic disorder     Pneumonia     PTSD (post-traumatic stress disorder)     Recently found out I qualifiy for this    Rosacea     Self-injurious behavior     Substance abuse        Allergies   Allergen Reactions    Erythromycin Anaphylaxis    Butorphanol     Lidocaine Seizure       Past Surgical History:   Procedure Laterality Date    ABDOMINAL SURGERY       SECTION      CHOLECYSTECTOMY      COLONOSCOPY      TOOTH EXTRACTION         Family History   Problem Relation Age of Onset    Heart disease Mother     Stroke Mother     Alcohol abuse Mother     Heart attack Father     Diabetes Father     Hypertension Father        Social History     Socioeconomic History    Marital status:    Tobacco Use    Smoking status: Former     Current packs/day: 0.00     Average packs/day: 1.5 packs/day for 20.0 years (30.0 ttl pk-yrs)     Types: Cigarettes     Start date: 1980     Quit date: 2000     Years since quittin.5     Passive exposure:  Past    Smokeless tobacco: Never   Vaping Use    Vaping status: Never Used   Substance and Sexual Activity    Alcohol use: Not Currently    Drug use: No    Sexual activity: Not Currently     Partners: Male     Birth control/protection: Post-menopausal           Objective   Physical Exam  Vitals and nursing note reviewed.   Constitutional:       General: She is in acute distress.      Appearance: She is well-developed and normal weight. She is not ill-appearing, toxic-appearing or diaphoretic.   HENT:      Head: Normocephalic.   Cardiovascular:      Rate and Rhythm: Regular rhythm. Tachycardia present.      Pulses: Normal pulses.   Pulmonary:      Effort: Pulmonary effort is normal. No tachypnea, bradypnea, accessory muscle usage or respiratory distress.      Breath sounds: Normal breath sounds. No decreased breath sounds, wheezing or rhonchi.   Chest:      Chest wall: No mass, deformity, tenderness, crepitus or edema.   Abdominal:      Palpations: Abdomen is soft.   Musculoskeletal:         General: Normal range of motion.      Cervical back: Normal range of motion and neck supple.      Right lower leg: No tenderness. No edema.      Left lower leg: No tenderness. No edema.   Skin:     General: Skin is warm and dry.      Capillary Refill: Capillary refill takes less than 2 seconds.      Coloration: Skin is not cyanotic or pale.      Findings: No ecchymosis, erythema or rash.   Neurological:      General: No focal deficit present.      Mental Status: She is alert and oriented to person, place, and time.   Psychiatric:         Mood and Affect: Mood normal.         Behavior: Behavior normal.         Procedures           ED Course  ED Course as of 06/28/25 1618   Sat Jun 28, 2025   1429 CBC shows white blood count 15.59 which is slightly elevated above 10.8.  Neutrophil percentage is up to 81.6 which is above 76 so a mild increase in the lymphocyte percentage down to 7.8 immature granulocytes are 0.2 so that is a concern  to me. [AR]   1429 Will treat pain get lab work and a CT EKG is normal go from there. [AR]   1430 EKG normal sinus rhythm rate 97 normal QRS ST and T waves normal intervals no STEMI [AR]   1430 D-dimer is elevated at 1.13. [AR]   1433 6/23/25 chest xray     FINDINGS: Lateral lingular lung mass measuring approximately 5 x 4.4 x  4.6 cm. CT chest recommended. Right lung appears clear. Lungs mildly  hyperexpanded. Mild left lower lobe atelectasis.     IMPRESSION:  5 cm lingular lung mass. Recommend further evaluation with  chest CT.   [AR]   1434 Ddimer elevated and changed CT to PE protocol, will get a good view of the mass as well [AR]   1500 Lactate 1.7 so not septic, troponin less than 6, no MI and normal EKG [AR]   1500 CMP BS elevated, CO2 lower than normal, may be related to being in pain and exhaling more, will keep in mind other causes, ALT is slightly elevated ; rads read of CT PE P [AR]   1527 Pain if much better after 0.5 mg of dilaudid [AR]   1530 Prelim report  The pulmonary arterial system is well-opacified with no evidence of  pulmonary embolus. No pathologically enlarged hilar or mediastinal lymph  nodes are seen. There is a small left pleural effusion. There are patchy  areas of atelectasis or inflammatory infiltrates in the bilateral lung  bases, including the right middle lobe, right lower lobe, left lower  lobe, and lingula.     There is an approximately 5.5 cm pleural-based soft tissue mass in the  left lower lobe. This does not appear to involve the major fissure.  The  mass is low density     IMPRESSION:  1. No evidence of pulmonary embolus.  2. Bilateral atelectasis or pneumonia.  3. Small left pleural effusion.  4. Low-density pleural-based mass in the left lower lobe. This could be  a necrotic neoplastic mass. Pulmonary abscess might have this appearance  though no rim enhancement or air within the mass is seen. It does not  appear to be loculated fluid within the fissure as a fissure is  seen  posterior to this mass.      [AR]   1530 CT shows mass, no PE, no evidence of abscess instead of a mass, left pleural effusion with inflammatory changes in middle and lower lobe, early pneumonia is possible causing pain from inflammation and elevated Neutrophils including immature cells; has low grade fever today in ED [AR]      ED Course User Index  [AR] Thuy Box MD                                           Medical Decision Making  Differential for dyspnea includes, but not limited to: COPD, asthma, PE, MI, pneumothorax, hemopneumothorax, pneumonia, pleural effusion, pulmonary fluid overload, CHF, acute allergic reaction, carbon monoxide poisoning, pulmonary hypertension, rapid heart rate, and pulmonary fibrosis.  Primary concern is mass overload, fluid within the chest, PE.  Getting a CT to better define the mass and look for pleural effusions and if the D-dimer elevated will look for clots.    You lab suggested a possible infection and possible blood clot in your lungs. Your EKG and other labs were unremarkable. The CT showed the mass and did not show clots in your lungs. You have a small amount of fluid (pleural effusion on the left side. The mass is on the outer end of the middle lobe (of 3 lobes) the middle and lower lobes have some inflammation vs infection plus inflammation. You had good pain control with Dilaudid 0.5 mg IV. I am assuming the inflammation and slight elevation in blood work suggesting infection is pneumonia. You were given IV antibiotics here today. Start the antibiotics tomorrow morning. It is recommended that with a mass (no matter what the cause) that two antibiotics be taken, so I sent the two prescriptions to your pharmacy. I sent pain medicine and nausea medicine to your pharmacy as well. Please call your doctor on Monday and let them know about the CT today and the treatment.    She and her  understood and agreed.    Problems Addressed:  Chest pain at rest:  complicated acute illness or injury  Dyspnea, unspecified type: complicated acute illness or injury  Mass of lingula of lung: complicated acute illness or injury  Pneumonia of left lung due to infectious organism, unspecified part of lung: complicated acute illness or injury    Amount and/or Complexity of Data Reviewed  Labs: ordered. Decision-making details documented in ED Course.  Radiology: ordered. Decision-making details documented in ED Course.  ECG/medicine tests: ordered.    Risk  Prescription drug management.        Final diagnoses:   Mass of lingula of lung   Dyspnea, unspecified type   Chest pain at rest   Pneumonia of left lung due to infectious organism, unspecified part of lung       ED Disposition  ED Disposition       ED Disposition   Discharge    Condition   Stable    Comment   --               Lissette Sesay MD  [address]  [place] [id number] [phone]      Call Monday         Medication List        New Prescriptions      cefpodoxime 200 MG tablet  Commonly known as: VANTIN  Take 2 tablets by mouth 2 (Two) Times a Day for 7 days.     doxycycline 100 MG capsule  Commonly known as: MONODOX  Take 1 capsule by mouth 2 (Two) Times a Day for 7 days.     HYDROcodone-acetaminophen 5-325 MG per tablet  Commonly known as: NORCO  Take 1 tablet by mouth Every 6 (Six) Hours As Needed for Moderate Pain for up to 15 doses.     ondansetron ODT 4 MG disintegrating tablet  Commonly known as: ZOFRAN-ODT  Place 1 tablet on the tongue Every 6 (Six) Hours As Needed for Nausea for up to 15 doses.               Where to Get Your Medications        These medications were sent to [facility] [id number] - [place], [place] - [address] [facility] [address] - [phone] [facility] [phone]   [address] [facility] [address]      Phone: [phone]   cefpodoxime 200 MG tablet  doxycycline 100 MG capsule  HYDROcodone-acetaminophen 5-325 MG per tablet  ondansetron ODT 4 MG disintegrating  tablet

## 2025-06-28 NOTE — ED NOTES
"Patient complaining of L sided chest pain, SOA at rest and with activity, and pain with inspiration. Denies cardiac history. Patient states she was seen at Urgent care and was told her X-ray showed a \"mass\" on her chest. Outpatient CT scan scheduled of chest. A&Ox4.   "

## 2025-06-28 NOTE — DISCHARGE INSTRUCTIONS
You lab suggested a possible infection and possible blood clot in your lungs. Your EKG and other labs were unremarkable. The CT showed the mass and did not show clots in your lungs. You have a small amount of fluid (pleural effusion on the left side. The mass is on the outer end of the middle lobe (of 3 lobes) the middle and lower lobes have some inflammation vs infection plus inflammation. You had good pain control with Dilaudid 0.5 mg IV. I am assuming the inflammation and slight elevation in blood work suggesting infection is pneumonia. You were given IV antibiotics here today. Start the antibiotics tomorrow morning. It is recommended that with a mass (no matter what the cause) that two antibiotics be taken, so I sent the two prescriptions to your pharmacy. I sent pain medicine and nausea medicine to your pharmacy as well. Please call your doctor on Monday and let them know about the CT today and the treatment.

## 2025-07-07 ENCOUNTER — TRANSCRIBE ORDERS (OUTPATIENT)
Dept: GENERAL RADIOLOGY | Facility: HOSPITAL | Age: 60
End: 2025-07-07
Payer: COMMERCIAL

## 2025-07-07 DIAGNOSIS — R91.8 MASS OF LOWER LOBE OF LEFT LUNG: Primary | ICD-10-CM

## 2025-07-11 ENCOUNTER — TELEPHONE (OUTPATIENT)
Age: 60
End: 2025-07-11
Payer: COMMERCIAL

## 2025-07-11 ENCOUNTER — OFFICE VISIT (OUTPATIENT)
Dept: FAMILY MEDICINE CLINIC | Facility: CLINIC | Age: 60
End: 2025-07-11
Payer: COMMERCIAL

## 2025-07-11 VITALS
TEMPERATURE: 98.9 F | DIASTOLIC BLOOD PRESSURE: 78 MMHG | WEIGHT: 146.3 LBS | OXYGEN SATURATION: 95 % | HEIGHT: 67 IN | SYSTOLIC BLOOD PRESSURE: 114 MMHG | HEART RATE: 108 BPM | BODY MASS INDEX: 22.96 KG/M2

## 2025-07-11 DIAGNOSIS — R91.8 PULMONARY MASS: ICD-10-CM

## 2025-07-11 DIAGNOSIS — E83.52 HYPERCALCEMIA: Primary | ICD-10-CM

## 2025-07-11 DIAGNOSIS — R73.01 ELEVATED FASTING GLUCOSE: ICD-10-CM

## 2025-07-11 DIAGNOSIS — Z00.00 ANNUAL PHYSICAL EXAM: Primary | ICD-10-CM

## 2025-07-11 DIAGNOSIS — F41.1 GAD (GENERALIZED ANXIETY DISORDER): ICD-10-CM

## 2025-07-11 DIAGNOSIS — E03.8 SUBCLINICAL HYPOTHYROIDISM: ICD-10-CM

## 2025-07-11 DIAGNOSIS — D72.829 LEUKOCYTOSIS, UNSPECIFIED TYPE: ICD-10-CM

## 2025-07-11 RX ORDER — ALBUTEROL SULFATE 90 UG/1
INHALANT RESPIRATORY (INHALATION)
COMMUNITY
Start: 2025-07-05

## 2025-07-11 RX ORDER — FLUTICASONE PROPIONATE AND SALMETEROL 250; 50 UG/1; UG/1
POWDER RESPIRATORY (INHALATION)
COMMUNITY
Start: 2025-07-04

## 2025-07-11 NOTE — PROGRESS NOTES
"[facility]  Primary Care   Visit Note    Chief Complaint  Chief Complaint   Patient presents with    Annual Exam    Lung Mass     5 cm lingular lung mass seen on ct after having rib pain. Bx scheduled a week from today    Rib Pain    Weight Loss    poor appetite       Subjective    History of Present Illness        Graciela Galvan presents to [facility]IMARY [facility] for   Weight Loss  Symptoms: fatigue    Symptoms: no chest pain and no cough       History of Present Illness  The patient presents for a new patient visit. She needing to establish with a new PCP as her previous provider is retiring. Her last physical looks to have been completed in September of 2024.    Her PMHx is significant for alcoholism, which she is currently 3 years sober. She is also treated for depression and anxiety by psychiatry. She receives Effexor for her mood, and she feels that it is usually effective. Unfortunately, she is in the process of getting worked up for a pulmonary mass, and this has caused her increased distress. Other stresses that she has been dealing with include the passing of her first  recently secondary to cancer and her son was very ill earlier this year as well, but he is doing well now.     The lung mass was found after complaints of severe left-sided rib pain prompted her to go to a local ED on 6/28/2025. Chest CT was performed that revealed \"Low-density pleural-based mass in the left lower lobe. This could be a necrotic neoplastic mass. Pulmonary abscess might have this appearance though no rim enhancement or air within the mass is seen. It does not appear to be loculated fluid within the fissure as a fissure is seen posterior to this mass.\" Her WBC count was also elevated to 15.59. She was placed on antibiotics at that time and referred to a pulmonologist. She states that the cough and rib pain have improved since taking the antibiotics. The pulmonologist has scheduled " "her for a CT needle biopsy for the lung mass that will be performed on 7/18/2025.      SOCIAL HISTORY  She is a former smoker. She has been sober for 3 years and does not intend to resume drinking.  No current facility-administered medications for this visit.     Review of Systems   Constitutional:  Positive for fatigue and weight loss.   Respiratory:  Negative for cough, chest tightness, shortness of breath and wheezing.    Cardiovascular:  Negative for chest pain and palpitations.   Skin: Negative.    Neurological: Negative.    Psychiatric/Behavioral:  Positive for sleep disturbance. The patient is nervous/anxious.        Objective   Vital Signs:   Visit Vitals  /78 (BP Location: Left arm, Patient Position: Sitting, Cuff Size: Adult)   Pulse 108   Temp 98.9 °F (37.2 °C) (Temporal)   Ht 170.2 cm (67\")   Wt 66.4 kg (146 lb 4.8 oz)   LMP  (LMP Unknown)   SpO2 95%   BMI 22.91 kg/m²          BMI is within normal parameters. No other follow-up for BMI required.     Physical Exam   Physical Exam  Mouth/Throat: Oropharynx clear, no erythema or exudate  Neck: No lymphadenopathy  Respiratory: Clear to auscultation, no wheezing, rales or rhonchi  Cardiovascular: Regular rate and rhythm, no murmurs, rubs, or gallops     Diabetic Foot Exam Performed    Result Review :  Results  Labs   - Blood Sugar: 06/28/2025, Slightly elevated   - Sodium Level: 06/28/2025, 134 (slightly low)   - Calcium Level: 06/28/2025, Chronically high   - ALT: 06/28/2025, 34 (slightly elevated)   - AST: 06/28/2025, 34 (slightly elevated)   - Creatinine: 06/28/2025, Normal   - GFR: 06/28/2025, Normal   - White Blood Cells: 06/28/2025, Elevated   - Red Blood Cells: 06/28/2025, Normal   - Hemoglobin: 06/28/2025, Normal   - Hematocrit: 06/28/2025, Normal   - Platelets: 06/28/2025, Normal   - Cholesterol: 09/2024, Normal    Imaging   - CT Scan: Revealed a mass    Diagnostic Testing   - Colonoscopy: Normal                          Assessment and Plan    "   Diagnoses and all orders for this visit:    1. Hypercalcemia (Primary)  -     Calcium, Ionized  -     PTH, Intact    2. Leukocytosis, unspecified type  -     CBC w AUTO Differential    3. MOISES (generalized anxiety disorder)    4. Pulmonary mass       Assessment & Plan  1. Lung mass   - A biopsy is scheduled for next Friday to determine the nature of the mass.  - Pain has significantly reduced with antibiotics  - Patient is established with pulmonology  - Elevated calcium noted for several years on previous labs     2. Elevated calcium levels.  - Her calcium levels have been chronically high, which could be due to a pituitary adenoma   - Elevated calcium can cause nausea, muscle cramps, abdominal pain, and she denies all of these symptoms.   - Will check PTH and ionized calcium.     3. Elevated white blood cell count.  - Her white blood cell count was previously elevated  - A recheck of her white blood cell count will be done today to see if the number has improved since completing antibiotics.    4. Anxiety and depression  - Treated by psychiatry  - She is going to reach out to her psychiatrist to discuss recent health issues and see if they can adjust her SNRI to help her better cope as she proceeds with the workup for the pulmonary mass.     4. Health maintenance.  - Her Cologuard test was positive, but a subsequent colonoscopy on 12/19/2024 was clear - repeat in 10 years.   - Mammogram completed 9/2024.   - Advised to schedule an annual physical for the beginning of 10/2025.           Follow Up   Return in about 3 months (around 10/1/2025) for Annual physical fasting labs one week prior.  Patient was given instructions and counseling regarding her condition or for health maintenance advice. Please see specific information pulled into the AVS if appropriate.     Patient or patient representative verbalized consent for the use of Ambient Listening during the visit with  Lula Pearson PA-C for chart  documentation. 7/11/2025  11:40 EDT    Lula Slider, ALANNA  [address]  [address] [phone]

## 2025-07-11 NOTE — TELEPHONE ENCOUNTER
Patient called and asked if you can increase her medication Effexor. She knows she has an upcoming appt with you on the 30th but wanted to see if she can get it increased before then.

## 2025-07-11 NOTE — H&P (VIEW-ONLY)
"[facility]  Primary Care   Visit Note    Chief Complaint  Chief Complaint   Patient presents with    Annual Exam    Lung Mass     5 cm lingular lung mass seen on ct after having rib pain. Bx scheduled a week from today    Rib Pain    Weight Loss    poor appetite       Subjective    History of Present Illness        Graciela Galvan presents to [facility] for   Weight Loss  Symptoms: fatigue    Symptoms: no chest pain and no cough       History of Present Illness  The patient presents for a new patient visit. She needing to establish with a new PCP as her previous provider is retiring. Her last physical looks to have been completed in September of 2024.    Her PMHx is significant for alcoholism, which she is currently 3 years sober. She is also treated for depression and anxiety by psychiatry. She receives Effexor for her mood, and she feels that it is usually effective. Unfortunately, she is in the process of getting worked up for a pulmonary mass, and this has caused her increased distress. Other stresses that she has been dealing with include the passing of her first  recently secondary to cancer and her son was very ill earlier this year as well, but he is doing well now.     The lung mass was found after complaints of severe left-sided rib pain prompted her to go to a local ED on 6/28/2025. Chest CT was performed that revealed \"Low-density pleural-based mass in the left lower lobe. This could be a necrotic neoplastic mass. Pulmonary abscess might have this appearance though no rim enhancement or air within the mass is seen. It does not appear to be loculated fluid within the fissure as a fissure is seen posterior to this mass.\" Her WBC count was also elevated to 15.59. She was placed on antibiotics at that time and referred to a pulmonologist. She states that the cough and rib pain have improved since taking the antibiotics. The pulmonologist has scheduled " "her for a CT needle biopsy for the lung mass that will be performed on 7/18/2025.      SOCIAL HISTORY  She is a former smoker. She has been sober for 3 years and does not intend to resume drinking.  No current facility-administered medications for this visit.     Review of Systems   Constitutional:  Positive for fatigue and weight loss.   Respiratory:  Negative for cough, chest tightness, shortness of breath and wheezing.    Cardiovascular:  Negative for chest pain and palpitations.   Skin: Negative.    Neurological: Negative.    Psychiatric/Behavioral:  Positive for sleep disturbance. The patient is nervous/anxious.        Objective   Vital Signs:   Visit Vitals  /78 (BP Location: Left arm, Patient Position: Sitting, Cuff Size: Adult)   Pulse 108   Temp 98.9 °F (37.2 °C) (Temporal)   Ht 170.2 cm (67\")   Wt 66.4 kg (146 lb 4.8 oz)   LMP  (LMP Unknown)   SpO2 95%   BMI 22.91 kg/m²          BMI is within normal parameters. No other follow-up for BMI required.     Physical Exam   Physical Exam  Mouth/Throat: Oropharynx clear, no erythema or exudate  Neck: No lymphadenopathy  Respiratory: Clear to auscultation, no wheezing, rales or rhonchi  Cardiovascular: Regular rate and rhythm, no murmurs, rubs, or gallops     Diabetic Foot Exam Performed    Result Review :  Results  Labs   - Blood Sugar: 06/28/2025, Slightly elevated   - Sodium Level: 06/28/2025, 134 (slightly low)   - Calcium Level: 06/28/2025, Chronically high   - ALT: 06/28/2025, 34 (slightly elevated)   - AST: 06/28/2025, 34 (slightly elevated)   - Creatinine: 06/28/2025, Normal   - GFR: 06/28/2025, Normal   - White Blood Cells: 06/28/2025, Elevated   - Red Blood Cells: 06/28/2025, Normal   - Hemoglobin: 06/28/2025, Normal   - Hematocrit: 06/28/2025, Normal   - Platelets: 06/28/2025, Normal   - Cholesterol: 09/2024, Normal    Imaging   - CT Scan: Revealed a mass    Diagnostic Testing   - Colonoscopy: Normal                          Assessment and Plan    "   Diagnoses and all orders for this visit:    1. Hypercalcemia (Primary)  -     Calcium, Ionized  -     PTH, Intact    2. Leukocytosis, unspecified type  -     CBC w AUTO Differential    3. MOISES (generalized anxiety disorder)    4. Pulmonary mass       Assessment & Plan  1. Lung mass   - A biopsy is scheduled for next Friday to determine the nature of the mass.  - Pain has significantly reduced with antibiotics  - Patient is established with pulmonology  - Elevated calcium noted for several years on previous labs     2. Elevated calcium levels.  - Her calcium levels have been chronically high, which could be due to a pituitary adenoma   - Elevated calcium can cause nausea, muscle cramps, abdominal pain, and she denies all of these symptoms.   - Will check PTH and ionized calcium.     3. Elevated white blood cell count.  - Her white blood cell count was previously elevated  - A recheck of her white blood cell count will be done today to see if the number has improved since completing antibiotics.    4. Anxiety and depression  - Treated by psychiatry  - She is going to reach out to her psychiatrist to discuss recent health issues and see if they can adjust her SNRI to help her better cope as she proceeds with the workup for the pulmonary mass.     4. Health maintenance.  - Her Cologuard test was positive, but a subsequent colonoscopy on 12/19/2024 was clear - repeat in 10 years.   - Mammogram completed 9/2024.   - Advised to schedule an annual physical for the beginning of 10/2025.           Follow Up   Return in about 3 months (around 10/1/2025) for Annual physical fasting labs one week prior.  Patient was given instructions and counseling regarding her condition or for health maintenance advice. Please see specific information pulled into the AVS if appropriate.     Patient or patient representative verbalized consent for the use of Ambient Listening during the visit with  Lula Pearson PA-C for chart  documentation. 7/11/2025  11:40 EDT    Lula Slider, ALANNA  [address]  [address] [phone]

## 2025-07-12 NOTE — PROGRESS NOTES
07/17/25 0001   Pre-Procedure Phone Call   Procedure Time Verified Yes   Arrival Time 0830   Procedure Location Verified Yes   Medical History Reviewed No   NPO Status Reinforced Yes   Ride and Caregiver Arranged Yes   Patient Knows to Bring Current Medications   (No changes in medications since last reviewed.)   Bring Outside Films Requested   (Chest xray 6/23/25 in PACS)

## 2025-07-14 ENCOUNTER — TELEPHONE (OUTPATIENT)
Age: 60
End: 2025-07-14
Payer: COMMERCIAL

## 2025-07-14 LAB
BASOPHILS # BLD AUTO: 0 X10E3/UL (ref 0–0.2)
BASOPHILS NFR BLD AUTO: 1 %
CA-I SERPL ISE-MCNC: 5.6 MG/DL (ref 4.5–5.6)
EOSINOPHIL # BLD AUTO: 0.2 X10E3/UL (ref 0–0.4)
EOSINOPHIL NFR BLD AUTO: 3 %
ERYTHROCYTE [DISTWIDTH] IN BLOOD BY AUTOMATED COUNT: 12 % (ref 11.7–15.4)
HCT VFR BLD AUTO: 43.1 % (ref 34–46.6)
HGB BLD-MCNC: 13.6 G/DL (ref 11.1–15.9)
IMM GRANULOCYTES # BLD AUTO: 0.1 X10E3/UL (ref 0–0.1)
IMM GRANULOCYTES NFR BLD AUTO: 2 %
LYMPHOCYTES # BLD AUTO: 2.2 X10E3/UL (ref 0.7–3.1)
LYMPHOCYTES NFR BLD AUTO: 44 %
MCH RBC QN AUTO: 29.9 PG (ref 26.6–33)
MCHC RBC AUTO-ENTMCNC: 31.6 G/DL (ref 31.5–35.7)
MCV RBC AUTO: 95 FL (ref 79–97)
MONOCYTES # BLD AUTO: 0.5 X10E3/UL (ref 0.1–0.9)
MONOCYTES NFR BLD AUTO: 9 %
NEUTROPHILS # BLD AUTO: 2 X10E3/UL (ref 1.4–7)
NEUTROPHILS NFR BLD AUTO: 41 %
PLATELET # BLD AUTO: 433 X10E3/UL (ref 150–450)
PTH-INTACT SERPL-MCNC: 34 PG/ML (ref 15–65)
RBC # BLD AUTO: 4.55 X10E6/UL (ref 3.77–5.28)
WBC # BLD AUTO: 5 X10E3/UL (ref 3.4–10.8)

## 2025-07-14 NOTE — TELEPHONE ENCOUNTER
Patient called and left a voicemail stating that she wanted to waive her mg on Effexor at the moment.

## 2025-07-16 NOTE — TELEPHONE ENCOUNTER
Attempted to contact the patient at approximately 1425 and 7/16/2025.  Patient was unable to be reached so voicemail was left advising patient to contact our office at her earliest convenience.

## 2025-07-17 ENCOUNTER — TELEPHONE (OUTPATIENT)
Age: 60
End: 2025-07-17
Payer: COMMERCIAL

## 2025-07-17 ENCOUNTER — HOSPITAL ENCOUNTER (OUTPATIENT)
Dept: GENERAL RADIOLOGY | Facility: HOSPITAL | Age: 60
Discharge: HOME | End: 2025-07-17
Payer: COMMERCIAL

## 2025-07-17 ENCOUNTER — HOSPITAL ENCOUNTER (OUTPATIENT)
Dept: CT IMAGING | Facility: HOSPITAL | Age: 60
Discharge: HOME | End: 2025-07-17
Payer: COMMERCIAL

## 2025-07-17 VITALS
HEIGHT: 65 IN | BODY MASS INDEX: 24.99 KG/M2 | WEIGHT: 150 LBS | RESPIRATION RATE: 14 BRPM | DIASTOLIC BLOOD PRESSURE: 58 MMHG | TEMPERATURE: 97.7 F | OXYGEN SATURATION: 94 % | HEART RATE: 72 BPM | SYSTOLIC BLOOD PRESSURE: 108 MMHG

## 2025-07-17 DIAGNOSIS — R91.8 MASS OF LOWER LOBE OF LEFT LUNG: ICD-10-CM

## 2025-07-17 DIAGNOSIS — F33.2 SEVERE EPISODE OF RECURRENT MAJOR DEPRESSIVE DISORDER, WITHOUT PSYCHOTIC FEATURES: Primary | ICD-10-CM

## 2025-07-17 DIAGNOSIS — F41.1 GAD (GENERALIZED ANXIETY DISORDER): ICD-10-CM

## 2025-07-17 LAB
INR PPP: 1.2 (ref 0.8–1.2)
PLATELET # BLD AUTO: 345 10*3/MM3 (ref 140–450)
PROTHROMBIN TIME: 12 SECONDS (ref 12.8–15.2)

## 2025-07-17 PROCEDURE — 88305 TISSUE EXAM BY PATHOLOGIST: CPT | Performed by: STUDENT IN AN ORGANIZED HEALTH CARE EDUCATION/TRAINING PROGRAM

## 2025-07-17 PROCEDURE — 88342 IMHCHEM/IMCYTCHM 1ST ANTB: CPT | Performed by: STUDENT IN AN ORGANIZED HEALTH CARE EDUCATION/TRAINING PROGRAM

## 2025-07-17 PROCEDURE — 25010000002 MIDAZOLAM PER 1 MG: Performed by: RADIOLOGY

## 2025-07-17 PROCEDURE — 25010000002 FENTANYL CITRATE (PF) 50 MCG/ML SOLUTION: Performed by: RADIOLOGY

## 2025-07-17 PROCEDURE — 85049 AUTOMATED PLATELET COUNT: CPT | Performed by: RADIOLOGY

## 2025-07-17 PROCEDURE — 85610 PROTHROMBIN TIME: CPT

## 2025-07-17 PROCEDURE — 71045 X-RAY EXAM CHEST 1 VIEW: CPT

## 2025-07-17 PROCEDURE — 88312 SPECIAL STAINS GROUP 1: CPT | Performed by: STUDENT IN AN ORGANIZED HEALTH CARE EDUCATION/TRAINING PROGRAM

## 2025-07-17 PROCEDURE — 88341 IMHCHEM/IMCYTCHM EA ADD ANTB: CPT | Performed by: STUDENT IN AN ORGANIZED HEALTH CARE EDUCATION/TRAINING PROGRAM

## 2025-07-17 RX ORDER — FENTANYL CITRATE 50 UG/ML
INJECTION, SOLUTION INTRAMUSCULAR; INTRAVENOUS AS NEEDED
Status: COMPLETED | OUTPATIENT
Start: 2025-07-17 | End: 2025-07-17

## 2025-07-17 RX ORDER — MIDAZOLAM HYDROCHLORIDE 1 MG/ML
INJECTION, SOLUTION INTRAMUSCULAR; INTRAVENOUS AS NEEDED
Status: COMPLETED | OUTPATIENT
Start: 2025-07-17 | End: 2025-07-17

## 2025-07-17 RX ORDER — SODIUM CHLORIDE 0.9 % (FLUSH) 0.9 %
10 SYRINGE (ML) INJECTION AS NEEDED
Status: DISCONTINUED | OUTPATIENT
Start: 2025-07-17 | End: 2025-07-18 | Stop reason: HOSPADM

## 2025-07-17 RX ORDER — SODIUM CHLORIDE 9 MG/ML
25 INJECTION, SOLUTION INTRAVENOUS ONCE
Status: DISCONTINUED | OUTPATIENT
Start: 2025-07-17 | End: 2025-07-18 | Stop reason: HOSPADM

## 2025-07-17 RX ORDER — MIDAZOLAM HYDROCHLORIDE 1 MG/ML
0.5 INJECTION, SOLUTION INTRAMUSCULAR; INTRAVENOUS ONCE AS NEEDED
Status: DISCONTINUED | OUTPATIENT
Start: 2025-07-17 | End: 2025-07-18 | Stop reason: HOSPADM

## 2025-07-17 RX ORDER — VENLAFAXINE HYDROCHLORIDE 37.5 MG/1
37.5 CAPSULE, EXTENDED RELEASE ORAL DAILY
Qty: 30 CAPSULE | Refills: 0 | Status: SHIPPED | OUTPATIENT
Start: 2025-07-17

## 2025-07-17 RX ORDER — CHLOROPROCAINE HYDROCHLORIDE 30 MG/ML
20 INJECTION, SOLUTION EPIDURAL; INFILTRATION; INTRACAUDAL; PERINEURAL ONCE
Status: DISCONTINUED | OUTPATIENT
Start: 2025-07-17 | End: 2025-07-18 | Stop reason: HOSPADM

## 2025-07-17 RX ADMIN — FENTANYL CITRATE 50 MCG: 50 INJECTION, SOLUTION INTRAMUSCULAR; INTRAVENOUS at 09:35

## 2025-07-17 RX ADMIN — MIDAZOLAM 1 MG: 1 INJECTION INTRAMUSCULAR; INTRAVENOUS at 09:34

## 2025-07-17 NOTE — DISCHARGE INSTRUCTIONS
EDUCATION /DISCHARGE INSTRUCTIONS  CT/US guided biopsy:  A biopsy is a procedure done to remove tissue for further analysis.  Before images are taken to locate the target area.  Images can be obtained using ultrasound, CT or MRI.  A physician will clean your skin with antiseptic soap, place a sterile towel around the site and administer a local anesthetic to numb the area.  The physician will then insert a special needle.  Sometimes images are taken of the needle after it is inserted to ensure the needle is in the correct area to be biopsied.   A sample is obtained and sent to the laboratory for study.  Occasionally the laboratory is unable to make a diagnosis from the sample and the procedure may need to be repeated.  Within a week the radiologist will send a report to your physician.  A pathologist will also examine the tissue and send a report.    Risks of the procedure include but are not limited to:   *  Bleeding    *  Infection   *  Puncture of surrounding organs *  Death     *  Lung collapse if the biopsy is near the chest which may require insertion of a      chest tube to re-inflate the lung if severe.    Benefits of the procedure:  Using x-ray helps to locate the area that requires a biopsy. The procedure is less invasive than a surgical procedure, there are no large incisions and it does not require anesthesia.    Alternatives to the procedure:  A biopsy can be performed surgically.  Risks of a surgical biopsy include exposure to anesthesia, infection, excessive bleeding and injury to abdominal organs.  A benefit of surgical biopsy is the ability to see the area to be biopsied and remove of a larger piece of tissue.    THIS EDUCATION INFORMATION WAS REVIEWED PRIOR TO PROCEDURE AND CONSENT. Patient initials__________________Time___________________    Post Procedure:    *  Expect the biopsy site may be tender up to one week.    *  Rest today (no pushing pulling or straining).   *  Slowly increase activity  tomorrow.    *  If you received sedation do not drive for 24 hours.   *  Keep dressing clean and dry.   *  Leave dressing on puncture site for 24 hours.    *  You may shower when dressing removed.  Call your doctor if experiencing:   *  Signs of infection such as redness, swelling, excessive pain and / or foul        smelling drainage from the puncture site.   *  Chills or fever over 101 degrees (by mouth).   *  Unrelieved pain.   *  Any new or severe symptoms.   *  If experiencing sudden / severe shortness of breath or chest pain go to the       nearest emergency room.   Following the procedure:     Follow-up with the ordering physician as directed.    Continue to take other medications as directed by your physician unless    otherwise instructed.       If you have any concerns please call the Radiology Nurses Desk at [phone].  You are the most important factor in your recovery.  Follow the above instructions carefully.

## 2025-07-17 NOTE — TELEPHONE ENCOUNTER
Contacted patient who reports recent worsening of depressive and anxious symptoms experienced over the past several weeks to 1 month.  Given positive therapeutic benefit associated with current dosage of venlafaxine and the absence of any significant side effects I do recommend slowly increasing daily dosage of her medication from 150 to 187.5 mg at this time.  A separate prescription for venlafaxine XR 37.5 mg will be sent to patient's pharmacy with instructions to take in conjunction with previously prescribed venlafaxine 150 mg.  Patient will be seen in her next appointment on 7/30/2025.

## 2025-07-17 NOTE — POST-PROCEDURE NOTE
POST PROCEDURE NOTE    Procedure: ct left lung biopsy    Pre-Procedure Diagnosis: left lung mass    Post-procedure Diagnosis: same    Findings: technically successful ct left lung biopsy    Complications: none immediate    Blood loss: none    Specimen Removed: three 20 gauge cores    Disposition:   Discharge home

## 2025-07-18 ENCOUNTER — HOSPITAL ENCOUNTER (OUTPATIENT)
Dept: CT IMAGING | Facility: HOSPITAL | Age: 60
End: 2025-07-18
Payer: COMMERCIAL

## 2025-07-18 LAB
CYTO UR: NORMAL
LAB AP CASE REPORT: NORMAL
LAB AP CLINICAL INFORMATION: NORMAL
LAB AP DIAGNOSIS COMMENT: NORMAL
LAB AP INTRADEPARTMENTAL CONSULT: NORMAL
LAB AP SPECIAL STAINS: NORMAL
PATH REPORT.FINAL DX SPEC: NORMAL
PATH REPORT.GROSS SPEC: NORMAL

## 2025-07-25 ENCOUNTER — TRANSCRIBE ORDERS (OUTPATIENT)
Dept: ADMINISTRATIVE | Facility: HOSPITAL | Age: 60
End: 2025-07-25
Payer: COMMERCIAL

## 2025-07-25 DIAGNOSIS — R91.8 PULMONARY MASS: Primary | ICD-10-CM

## 2025-07-28 ENCOUNTER — TELEPHONE (OUTPATIENT)
Dept: FAMILY MEDICINE CLINIC | Facility: CLINIC | Age: 60
End: 2025-07-28

## 2025-07-28 DIAGNOSIS — I10 BENIGN ESSENTIAL HYPERTENSION: ICD-10-CM

## 2025-07-28 RX ORDER — METOPROLOL SUCCINATE 50 MG/1
50 TABLET, EXTENDED RELEASE ORAL DAILY
Qty: 90 TABLET | Refills: 1 | Status: SHIPPED | OUTPATIENT
Start: 2025-07-28

## 2025-07-30 ENCOUNTER — OFFICE VISIT (OUTPATIENT)
Age: 60
End: 2025-07-30
Payer: COMMERCIAL

## 2025-07-30 VITALS
SYSTOLIC BLOOD PRESSURE: 122 MMHG | HEIGHT: 65 IN | OXYGEN SATURATION: 98 % | BODY MASS INDEX: 24.99 KG/M2 | WEIGHT: 150 LBS | HEART RATE: 79 BPM | DIASTOLIC BLOOD PRESSURE: 76 MMHG

## 2025-07-30 DIAGNOSIS — F10.21 ALCOHOL USE DISORDER, SEVERE, IN SUSTAINED REMISSION: ICD-10-CM

## 2025-07-30 DIAGNOSIS — F41.1 GAD (GENERALIZED ANXIETY DISORDER): ICD-10-CM

## 2025-07-30 DIAGNOSIS — F33.2 SEVERE EPISODE OF RECURRENT MAJOR DEPRESSIVE DISORDER, WITHOUT PSYCHOTIC FEATURES: Primary | ICD-10-CM

## 2025-08-07 RX ORDER — TRAZODONE HYDROCHLORIDE 100 MG/1
TABLET ORAL
Qty: 25 TABLET | Refills: 0 | OUTPATIENT
Start: 2025-08-07

## 2025-08-11 DIAGNOSIS — F33.2 SEVERE EPISODE OF RECURRENT MAJOR DEPRESSIVE DISORDER, WITHOUT PSYCHOTIC FEATURES: ICD-10-CM

## 2025-08-11 DIAGNOSIS — F41.1 GAD (GENERALIZED ANXIETY DISORDER): ICD-10-CM

## 2025-08-11 RX ORDER — VENLAFAXINE HYDROCHLORIDE 37.5 MG/1
37.5 CAPSULE, EXTENDED RELEASE ORAL DAILY
Qty: 30 CAPSULE | Refills: 0 | Status: SHIPPED | OUTPATIENT
Start: 2025-08-11

## 2025-08-11 RX ORDER — VENLAFAXINE HYDROCHLORIDE 150 MG/1
150 CAPSULE, EXTENDED RELEASE ORAL DAILY
Qty: 30 CAPSULE | Refills: 2 | Status: SHIPPED | OUTPATIENT
Start: 2025-08-11 | End: 2025-11-09

## 2025-08-13 RX ORDER — TRAZODONE HYDROCHLORIDE 100 MG/1
TABLET ORAL
Qty: 30 TABLET | Refills: 1 | Status: SHIPPED | OUTPATIENT
Start: 2025-08-13

## 2025-08-13 RX ORDER — TRAZODONE HYDROCHLORIDE 100 MG/1
TABLET ORAL
Qty: 25 TABLET | Refills: 0 | OUTPATIENT
Start: 2025-08-13